# Patient Record
Sex: MALE | Race: WHITE | NOT HISPANIC OR LATINO | Employment: OTHER | URBAN - NONMETROPOLITAN AREA
[De-identification: names, ages, dates, MRNs, and addresses within clinical notes are randomized per-mention and may not be internally consistent; named-entity substitution may affect disease eponyms.]

---

## 2018-04-22 ENCOUNTER — HOSPITAL ENCOUNTER (EMERGENCY)
Facility: HOSPITAL | Age: 48
Discharge: HOME | End: 2018-04-22
Attending: EMERGENCY MEDICINE | Admitting: EMERGENCY MEDICINE

## 2018-04-22 ENCOUNTER — APPOINTMENT (OUTPATIENT)
Dept: GENERAL RADIOLOGY | Facility: HOSPITAL | Age: 48
End: 2018-04-22

## 2018-04-22 VITALS
DIASTOLIC BLOOD PRESSURE: 74 MMHG | SYSTOLIC BLOOD PRESSURE: 103 MMHG | TEMPERATURE: 98.3 F | BODY MASS INDEX: 21.98 KG/M2 | HEIGHT: 68 IN | RESPIRATION RATE: 17 BRPM | WEIGHT: 145 LBS | HEART RATE: 55 BPM | OXYGEN SATURATION: 98 %

## 2018-04-22 DIAGNOSIS — R07.81 PLEURITIC CHEST PAIN: Primary | ICD-10-CM

## 2018-04-22 LAB
ALBUMIN SERPL-MCNC: 4.6 G/DL (ref 3.5–5)
ALBUMIN/GLOB SERPL: 1.7 G/DL (ref 1–2)
ALP SERPL-CCNC: 65 U/L (ref 38–126)
ALT SERPL W P-5'-P-CCNC: 29 U/L (ref 13–69)
ANION GAP SERPL CALCULATED.3IONS-SCNC: 13.4 MMOL/L (ref 10–20)
AST SERPL-CCNC: 25 U/L (ref 15–46)
BASOPHILS # BLD AUTO: 0.02 10*3/MM3 (ref 0–0.2)
BASOPHILS NFR BLD AUTO: 0.2 % (ref 0–2.5)
BILIRUB SERPL-MCNC: 0.5 MG/DL (ref 0.2–1.3)
BUN BLD-MCNC: 10 MG/DL (ref 7–20)
BUN/CREAT SERPL: 12.5 (ref 6.3–21.9)
CALCIUM SPEC-SCNC: 9.2 MG/DL (ref 8.4–10.2)
CHLORIDE SERPL-SCNC: 103 MMOL/L (ref 98–107)
CO2 SERPL-SCNC: 31 MMOL/L (ref 26–30)
CREAT BLD-MCNC: 0.8 MG/DL (ref 0.6–1.3)
DEPRECATED RDW RBC AUTO: 48.2 FL (ref 37–54)
EOSINOPHIL # BLD AUTO: 0.07 10*3/MM3 (ref 0–0.7)
EOSINOPHIL NFR BLD AUTO: 0.7 % (ref 0–7)
ERYTHROCYTE [DISTWIDTH] IN BLOOD BY AUTOMATED COUNT: 14.1 % (ref 11.5–14.5)
GFR SERPL CREATININE-BSD FRML MDRD: 104 ML/MIN/1.73
GLOBULIN UR ELPH-MCNC: 2.7 GM/DL
GLUCOSE BLD-MCNC: 99 MG/DL (ref 74–98)
HCT VFR BLD AUTO: 38.2 % (ref 42–52)
HGB BLD-MCNC: 13.3 G/DL (ref 14–18)
HOLD SPECIMEN: NORMAL
HOLD SPECIMEN: NORMAL
IMM GRANULOCYTES # BLD: 0.03 10*3/MM3 (ref 0–0.06)
IMM GRANULOCYTES NFR BLD: 0.3 % (ref 0–0.6)
LYMPHOCYTES # BLD AUTO: 1.97 10*3/MM3 (ref 0.6–3.4)
LYMPHOCYTES NFR BLD AUTO: 19.2 % (ref 10–50)
MCH RBC QN AUTO: 32.1 PG (ref 27–31)
MCHC RBC AUTO-ENTMCNC: 34.8 G/DL (ref 30–37)
MCV RBC AUTO: 92.3 FL (ref 80–94)
MONOCYTES # BLD AUTO: 0.99 10*3/MM3 (ref 0–0.9)
MONOCYTES NFR BLD AUTO: 9.6 % (ref 0–12)
NEUTROPHILS # BLD AUTO: 7.18 10*3/MM3 (ref 2–6.9)
NEUTROPHILS NFR BLD AUTO: 70 % (ref 37–80)
NRBC BLD MANUAL-RTO: 0 /100 WBC (ref 0–0)
PLATELET # BLD AUTO: 272 10*3/MM3 (ref 130–400)
PMV BLD AUTO: 10.6 FL (ref 6–12)
POTASSIUM BLD-SCNC: 4.4 MMOL/L (ref 3.5–5.1)
PROT SERPL-MCNC: 7.3 G/DL (ref 6.3–8.2)
RBC # BLD AUTO: 4.14 10*6/MM3 (ref 4.7–6.1)
SODIUM BLD-SCNC: 143 MMOL/L (ref 137–145)
TROPONIN I SERPL-MCNC: <0.012 NG/ML (ref 0–0.03)
WBC NRBC COR # BLD: 10.26 10*3/MM3 (ref 4.8–10.8)
WHOLE BLOOD HOLD SPECIMEN: NORMAL
WHOLE BLOOD HOLD SPECIMEN: NORMAL

## 2018-04-22 PROCEDURE — 25010000002 ONDANSETRON PER 1 MG: Performed by: NURSE PRACTITIONER

## 2018-04-22 PROCEDURE — 96375 TX/PRO/DX INJ NEW DRUG ADDON: CPT

## 2018-04-22 PROCEDURE — 84484 ASSAY OF TROPONIN QUANT: CPT | Performed by: EMERGENCY MEDICINE

## 2018-04-22 PROCEDURE — 85025 COMPLETE CBC W/AUTO DIFF WBC: CPT | Performed by: EMERGENCY MEDICINE

## 2018-04-22 PROCEDURE — 25010000002 KETOROLAC TROMETHAMINE PER 15 MG: Performed by: NURSE PRACTITIONER

## 2018-04-22 PROCEDURE — 99284 EMERGENCY DEPT VISIT MOD MDM: CPT

## 2018-04-22 PROCEDURE — 71045 X-RAY EXAM CHEST 1 VIEW: CPT

## 2018-04-22 PROCEDURE — 93005 ELECTROCARDIOGRAM TRACING: CPT | Performed by: EMERGENCY MEDICINE

## 2018-04-22 PROCEDURE — 96374 THER/PROPH/DIAG INJ IV PUSH: CPT

## 2018-04-22 PROCEDURE — 80053 COMPREHEN METABOLIC PANEL: CPT | Performed by: EMERGENCY MEDICINE

## 2018-04-22 RX ORDER — KETOROLAC TROMETHAMINE 30 MG/ML
30 INJECTION, SOLUTION INTRAMUSCULAR; INTRAVENOUS ONCE
Status: COMPLETED | OUTPATIENT
Start: 2018-04-22 | End: 2018-04-22

## 2018-04-22 RX ORDER — METHOCARBAMOL 750 MG/1
750 TABLET, FILM COATED ORAL 3 TIMES DAILY PRN
Qty: 21 TABLET | Refills: 0 | Status: SHIPPED | OUTPATIENT
Start: 2018-04-22 | End: 2018-08-03

## 2018-04-22 RX ORDER — PREGABALIN 25 MG/1
25 CAPSULE ORAL NIGHTLY
COMMUNITY
End: 2020-08-10

## 2018-04-22 RX ORDER — SODIUM CHLORIDE 0.9 % (FLUSH) 0.9 %
10 SYRINGE (ML) INJECTION AS NEEDED
Status: DISCONTINUED | OUTPATIENT
Start: 2018-04-22 | End: 2018-04-22 | Stop reason: HOSPADM

## 2018-04-22 RX ORDER — TRAZODONE HYDROCHLORIDE 50 MG/1
50 TABLET ORAL NIGHTLY
COMMUNITY
End: 2018-08-03

## 2018-04-22 RX ORDER — ASPIRIN 325 MG
325 TABLET ORAL ONCE
Status: COMPLETED | OUTPATIENT
Start: 2018-04-22 | End: 2018-04-22

## 2018-04-22 RX ORDER — MELOXICAM 7.5 MG/1
7.5 TABLET ORAL DAILY
Qty: 14 TABLET | Refills: 0 | Status: SHIPPED | OUTPATIENT
Start: 2018-04-22 | End: 2018-08-03

## 2018-04-22 RX ORDER — ONDANSETRON 2 MG/ML
4 INJECTION INTRAMUSCULAR; INTRAVENOUS ONCE
Status: COMPLETED | OUTPATIENT
Start: 2018-04-22 | End: 2018-04-22

## 2018-04-22 RX ADMIN — ASPIRIN 325 MG ORAL TABLET 325 MG: 325 PILL ORAL at 19:34

## 2018-04-22 RX ADMIN — ONDANSETRON 4 MG: 2 INJECTION INTRAMUSCULAR; INTRAVENOUS at 19:34

## 2018-04-22 RX ADMIN — KETOROLAC TROMETHAMINE 30 MG: 30 INJECTION, SOLUTION INTRAMUSCULAR; INTRAVENOUS at 19:34

## 2018-04-22 NOTE — ED PROVIDER NOTES
Subjective   History of Present Illness  This is a 47 year old male who comes in today complaining of left side chest pain. He reports the pain started with tightening in his chest about 1 week ago. He reports that 2 days ago the pain started getting worse with deep breathing and with any movement. He also reports some nausea with these symptoms.   Review of Systems   Constitutional: Negative.    HENT: Negative.    Eyes: Negative.    Respiratory: Positive for shortness of breath.    Cardiovascular: Positive for chest pain.   Gastrointestinal: Positive for nausea.   Endocrine: Negative.    Genitourinary: Negative.    Musculoskeletal: Negative.    Skin: Negative.    Allergic/Immunologic: Negative.    Neurological: Negative.    Hematological: Negative.    Psychiatric/Behavioral: Negative.        Past Medical History:   Diagnosis Date   • Injury of back        Allergies   Allergen Reactions   • Penicillins Anaphylaxis       History reviewed. No pertinent surgical history.    History reviewed. No pertinent family history.    Social History     Social History   • Marital status:      Social History Main Topics   • Smoking status: Current Every Day Smoker     Packs/day: 0.50     Types: Cigarettes   • Alcohol use Yes   • Drug use: No     Other Topics Concern   • Not on file           Objective   Physical Exam   Constitutional: He appears well-developed and well-nourished.   Nursing note and vitals reviewed.  GEN: No acute distress  Head: Normocephalic, atraumatic  Eyes: Pupils equal round reactive to light  ENT: Posterior pharynx normal in appearance, oral mucosa is moist  Chest: Nontender to palpation  Cardiovascular: Regular rate  Lungs: Clear to auscultation bilaterally  Abdomen: Soft, nontender, nondistended, no peritoneal signs  Extremities: No edema, normal appearance  Neuro: GCS 15  Psych: Mood and affect are appropriate      ECG 12 Lead    Date/Time: 4/22/2018 7:19 PM  Performed by: PHYLLIS CARRASCO  PAZ  Authorized by: JOSEPH ESCALANTE   Interpreted by physician  Comparison: not compared with previous ECG   Previous ECG: no previous ECG available  Rhythm: sinus rhythm  Clinical impression: non-specific ECG               ED Course  ED Course                  MDM  Number of Diagnoses or Management Options     Amount and/or Complexity of Data Reviewed  Clinical lab tests: ordered and reviewed  Tests in the radiology section of CPT®: ordered and reviewed  Review and summarize past medical records: yes  Discuss the patient with other providers: yes  Independent visualization of images, tracings, or specimens: yes    Risk of Complications, Morbidity, and/or Mortality  Presenting problems: moderate  Diagnostic procedures: moderate  Management options: moderate        Final diagnoses:   Pleuritic chest pain            Amaris Beard, RICARDO  04/22/18 2020

## 2018-08-01 DIAGNOSIS — M79.641 RIGHT HAND PAIN: Primary | ICD-10-CM

## 2018-08-03 ENCOUNTER — PREP FOR SURGERY (OUTPATIENT)
Dept: OTHER | Facility: HOSPITAL | Age: 48
End: 2018-08-03

## 2018-08-03 ENCOUNTER — OFFICE VISIT (OUTPATIENT)
Dept: ORTHOPEDIC SURGERY | Facility: CLINIC | Age: 48
End: 2018-08-03

## 2018-08-03 VITALS — WEIGHT: 137 LBS | BODY MASS INDEX: 22.82 KG/M2 | RESPIRATION RATE: 18 BRPM | HEIGHT: 65 IN

## 2018-08-03 DIAGNOSIS — M65.331 TRIGGER MIDDLE FINGER OF RIGHT HAND: Primary | ICD-10-CM

## 2018-08-03 PROCEDURE — 99213 OFFICE O/P EST LOW 20 MIN: CPT | Performed by: PHYSICIAN ASSISTANT

## 2018-08-03 RX ORDER — CLINDAMYCIN PHOSPHATE 900 MG/50ML
900 INJECTION, SOLUTION INTRAVENOUS
Status: CANCELLED | OUTPATIENT
Start: 2018-08-09 | End: 2018-08-10

## 2018-08-03 NOTE — PROGRESS NOTES
Subjective   Patient ID: Kenan Miranda is a 47 y.o. right hand dominant male  Pain of the Right Hand (Trigger Finger - Index)         History of Present Illness  Patient presents with chronic continued right third digit trigger finger.  He states he was told in the past he would need surgery for a trigger finger release however, he had to postpone this due to a death in his family.  He states the pain has continued.  He denies injury or trauma.  He states occasionally he does have some numbness and tingling into the second and third digits.  He was also told in the past he has rheumatoid arthritis but has never been evaluated by rheumatologist.        Pain Score: 6  Pain Location: Hand  Pain Orientation: Right     Pain Descriptors: Aching  Pain Frequency: Constant/continuous                    Pain Intervention(s): Rest          Past Medical History:   Diagnosis Date   • Injury of back    • Rheumatoid arthritis (CMS/HCC)         Past Surgical History:   Procedure Laterality Date   • COLONOSCOPY         Family History   Problem Relation Age of Onset   • Cancer Other        Social History     Social History   • Marital status:      Spouse name: N/A   • Number of children: N/A   • Years of education: N/A     Occupational History   • Not on file.     Social History Main Topics   • Smoking status: Current Every Day Smoker     Packs/day: 0.50     Types: Cigarettes   • Smokeless tobacco: Not on file   • Alcohol use Yes   • Drug use: Yes   • Sexual activity: Defer     Other Topics Concern   • Not on file     Social History Narrative   • No narrative on file         Current Outpatient Prescriptions:   •  pregabalin (LYRICA) 25 MG capsule, Take 25 mg by mouth Every Night., Disp: , Rfl:   •  SUBOXONE 8-2 MG film film, , Disp: , Rfl:     Allergies   Allergen Reactions   • Penicillins Anaphylaxis       Review of Systems   Constitutional: Negative for fever.   HENT: Negative for voice change.    Eyes: Negative for visual  "disturbance.   Respiratory: Negative for shortness of breath.    Cardiovascular: Negative for chest pain.   Gastrointestinal: Negative for abdominal distention and abdominal pain.   Genitourinary: Negative for dysuria.   Musculoskeletal: Positive for arthralgias. Negative for gait problem and joint swelling.   Skin: Negative for rash.   Neurological: Negative for speech difficulty.   Hematological: Does not bruise/bleed easily.   Psychiatric/Behavioral: Negative for confusion.   All other systems reviewed and are negative.      Objective   Resp 18   Ht 165.1 cm (65\")   Wt 62.1 kg (137 lb)   BMI 22.80 kg/m²    Physical Exam   Constitutional: He is oriented to person, place, and time. He appears well-nourished.   Eyes: Conjunctivae are normal.   Neck: No tracheal deviation present.   Cardiovascular: Normal rate.    Pulmonary/Chest: Effort normal. No respiratory distress.   Abdominal: He exhibits no distension.   Musculoskeletal:        Right wrist: He exhibits normal range of motion, no tenderness, no bony tenderness, no swelling, no effusion, no crepitus, no deformity and no laceration.        Right hand: He exhibits tenderness. He exhibits normal capillary refill, no deformity and no swelling. Normal sensation noted. Normal strength noted.   Neurological: He is alert and oriented to person, place, and time.   Skin: Capillary refill takes less than 2 seconds.   Psychiatric: He has a normal mood and affect.   Vitals reviewed.    Right Hand Exam     Range of Motion     Wrist   Extension: normal   Flexion: normal   Pronation: normal   Supination: normal     Muscle Strength   The patient has normal right wrist strength.    Tests   Tinel’s Sign (Medial Nerve): positive  Finkelstein: negative    Other   Erythema: absent  Sensation: normal  Pulse: present           Mothers no deformity to bilateral hands there are nodules consistent with rheumatoid nodules to the DIP joints of the right and left hand  Neurologic Exam "     Mental Status   Oriented to person, place, and time.      Right Hand/Wrist Exam     Muscle Strength   Normal right wrist strength           There is a locking mechanism with flexion of the right third digit consistent with trigger finger.  There is a painful palpable nodule overlying the A1 pulley.  No redness or warmth to the hand    Assessment/Plan   Independent Review of Radiographic Studies:    No new imaging done today.      Procedures       Kenan was seen today for pain.    Diagnoses and all orders for this visit:    Trigger middle finger of right hand       Orthopedic activities reviewed and patient expressed appreciation  Discussion of orthopedic goals  Risk, benefits, and merits of treatment alternatives reviewed with the patient and questions answered  The nature of the proposed surgery reviewed with the patient including risks, benefits, rehabilitation, recovery timeframe, and outcome expectations    Recommendations/Plan:  Patient is encouraged to call or return for any issues or concerns.  PLAN: Right 3rd digit Trigger finger release    I discussed with the patient because he does have underlying past medical history for rheumatoid arthritis and clinically his exam is consistent with carpal tunnel syndrome starting with trigger finger release would be a good option but he may need to be evaluated by rheumatologist for his rheumatoid arthritis.  Patient agreeable to call or return sooner for any concerns.

## 2018-08-06 ENCOUNTER — APPOINTMENT (OUTPATIENT)
Dept: PREADMISSION TESTING | Facility: HOSPITAL | Age: 48
End: 2018-08-06

## 2018-08-07 ENCOUNTER — APPOINTMENT (OUTPATIENT)
Dept: PREADMISSION TESTING | Facility: HOSPITAL | Age: 48
End: 2018-08-07

## 2018-09-28 ENCOUNTER — TELEPHONE (OUTPATIENT)
Dept: ORTHOPEDIC SURGERY | Facility: CLINIC | Age: 48
End: 2018-09-28

## 2018-12-16 ENCOUNTER — HOSPITAL ENCOUNTER (EMERGENCY)
Facility: HOSPITAL | Age: 48
Discharge: HOME | End: 2018-12-16
Attending: STUDENT IN AN ORGANIZED HEALTH CARE EDUCATION/TRAINING PROGRAM | Admitting: STUDENT IN AN ORGANIZED HEALTH CARE EDUCATION/TRAINING PROGRAM

## 2018-12-16 VITALS
BODY MASS INDEX: 21.66 KG/M2 | HEIGHT: 67 IN | HEART RATE: 81 BPM | DIASTOLIC BLOOD PRESSURE: 72 MMHG | OXYGEN SATURATION: 99 % | SYSTOLIC BLOOD PRESSURE: 129 MMHG | TEMPERATURE: 98.3 F | WEIGHT: 138 LBS | RESPIRATION RATE: 17 BRPM

## 2018-12-16 DIAGNOSIS — J36 PERITONSILLAR ABSCESS: Primary | ICD-10-CM

## 2018-12-16 PROCEDURE — 25010000002 DEXAMETHASONE SODIUM PHOSPHATE 10 MG/ML SOLUTION: Performed by: NURSE PRACTITIONER

## 2018-12-16 PROCEDURE — 99282 EMERGENCY DEPT VISIT SF MDM: CPT

## 2018-12-16 PROCEDURE — 96372 THER/PROPH/DIAG INJ SC/IM: CPT

## 2018-12-16 RX ORDER — DEXAMETHASONE SODIUM PHOSPHATE 10 MG/ML
10 INJECTION, SOLUTION INTRAMUSCULAR; INTRAVENOUS ONCE
Status: COMPLETED | OUTPATIENT
Start: 2018-12-16 | End: 2018-12-16

## 2018-12-16 RX ORDER — CLINDAMYCIN HYDROCHLORIDE 300 MG/1
300 CAPSULE ORAL 4 TIMES DAILY
Qty: 28 CAPSULE | Refills: 0 | Status: SHIPPED | OUTPATIENT
Start: 2018-12-16 | End: 2020-08-10

## 2018-12-16 RX ADMIN — DEXAMETHASONE SODIUM PHOSPHATE 10 MG: 10 INJECTION, SOLUTION INTRAMUSCULAR; INTRAVENOUS at 15:17

## 2018-12-16 NOTE — ED PROVIDER NOTES
Subjective   History of Present Illness  This 48-year-old gentleman who comes in today complaining of a sore throat ×3 days.  He reports that the pain is more intense and more difficult to swallow today.  He denies any nausea vomiting or diarrhea.  Review of Systems   Constitutional: Negative.    HENT: Positive for sore throat.    Eyes: Negative.    Respiratory: Negative.    Cardiovascular: Negative.    Gastrointestinal: Negative.    Endocrine: Negative.    Genitourinary: Negative.    Musculoskeletal: Negative.    Skin: Negative.    Allergic/Immunologic: Negative.    Neurological: Negative.    Hematological: Negative.    Psychiatric/Behavioral: Negative.    All other systems reviewed and are negative.      Past Medical History:   Diagnosis Date   • Injury of back    • Rheumatoid arthritis (CMS/HCC)        Allergies   Allergen Reactions   • Penicillins Anaphylaxis       Past Surgical History:   Procedure Laterality Date   • COLONOSCOPY         Family History   Problem Relation Age of Onset   • Cancer Other        Social History     Socioeconomic History   • Marital status:      Spouse name: Not on file   • Number of children: Not on file   • Years of education: Not on file   • Highest education level: Not on file   Tobacco Use   • Smoking status: Current Every Day Smoker     Packs/day: 0.50     Types: Cigarettes   • Smokeless tobacco: Never Used   Substance and Sexual Activity   • Alcohol use: Yes   • Drug use: No     Comment: Patient denies history of illegal substance abuse   • Sexual activity: Defer           Objective   Physical Exam   Constitutional: He appears well-developed and well-nourished.   HENT:   Mouth/Throat:       Nursing note and vitals reviewed.  GEN: No acute distress  Head: Normocephalic, atraumatic  Eyes: Pupils equal round reactive to light  ENT: Posterior pharynx normal in appearance, oral mucosa is moist  Chest: Nontender to palpation  Cardiovascular: Regular rate  Lungs: Clear to  auscultation bilaterally  Abdomen: Soft, nontender, nondistended, no peritoneal signs  Extremities: No edema, normal appearance  Neuro: GCS 15  Psych: Mood and affect are appropriate      Procedures           ED Course                  MDM  Number of Diagnoses or Management Options  Diagnosis management comments: Dr. Peterson also in to evaluate patient.  At this time drainage of the abscess does not appear to be necessary we will go ahead and treat him with steroid here and give him some clindamycin to start it fact he has an anaphylaxis to penicillin.  We will also have him follow-up with ENT tomorrow for reevaluation.  I've given him strict return to care structures including any compromise of his airway and he is agreeable to this plan of care.       Amount and/or Complexity of Data Reviewed  Review and summarize past medical records: yes  Discuss the patient with other providers: yes    Risk of Complications, Morbidity, and/or Mortality  Presenting problems: moderate  Diagnostic procedures: low  Management options: moderate          Final diagnoses:   Peritonsillar abscess            Amaris Beard, APRN  12/16/18 1708

## 2019-10-09 ENCOUNTER — APPOINTMENT (OUTPATIENT)
Dept: ULTRASOUND IMAGING | Facility: HOSPITAL | Age: 49
End: 2019-10-09

## 2019-10-09 ENCOUNTER — HOSPITAL ENCOUNTER (EMERGENCY)
Facility: HOSPITAL | Age: 49
Discharge: HOME | End: 2019-10-09
Attending: EMERGENCY MEDICINE | Admitting: EMERGENCY MEDICINE

## 2019-10-09 ENCOUNTER — APPOINTMENT (OUTPATIENT)
Dept: GENERAL RADIOLOGY | Facility: HOSPITAL | Age: 49
End: 2019-10-09

## 2019-10-09 VITALS
WEIGHT: 150 LBS | OXYGEN SATURATION: 98 % | DIASTOLIC BLOOD PRESSURE: 78 MMHG | SYSTOLIC BLOOD PRESSURE: 105 MMHG | RESPIRATION RATE: 18 BRPM | HEIGHT: 67 IN | HEART RATE: 78 BPM | TEMPERATURE: 98.4 F | BODY MASS INDEX: 23.54 KG/M2

## 2019-10-09 DIAGNOSIS — M71.21 BAKER'S CYST OF KNEE, RIGHT: Primary | ICD-10-CM

## 2019-10-09 PROCEDURE — 99283 EMERGENCY DEPT VISIT LOW MDM: CPT

## 2019-10-09 PROCEDURE — 73562 X-RAY EXAM OF KNEE 3: CPT

## 2019-10-09 PROCEDURE — 93971 EXTREMITY STUDY: CPT

## 2019-10-09 RX ORDER — SENNOSIDES 8.6 MG
650 CAPSULE ORAL EVERY 8 HOURS PRN
Qty: 12 TABLET | Refills: 0 | Status: SHIPPED | OUTPATIENT
Start: 2019-10-09 | End: 2023-03-22 | Stop reason: HOSPADM

## 2019-10-09 RX ORDER — IBUPROFEN 600 MG/1
600 TABLET ORAL ONCE
Status: COMPLETED | OUTPATIENT
Start: 2019-10-09 | End: 2019-10-09

## 2019-10-09 RX ORDER — IBUPROFEN 600 MG/1
600 TABLET ORAL EVERY 8 HOURS PRN
Qty: 12 TABLET | Refills: 0 | Status: SHIPPED | OUTPATIENT
Start: 2019-10-09 | End: 2022-12-10 | Stop reason: SDUPTHER

## 2019-10-09 RX ORDER — ACETAMINOPHEN 325 MG/1
650 TABLET ORAL ONCE
Status: COMPLETED | OUTPATIENT
Start: 2019-10-09 | End: 2019-10-09

## 2019-10-09 RX ADMIN — ACETAMINOPHEN 650 MG: 325 TABLET, FILM COATED ORAL at 19:52

## 2019-10-09 RX ADMIN — IBUPROFEN 600 MG: 600 TABLET ORAL at 19:52

## 2019-10-09 NOTE — ED PROVIDER NOTES
Subjective   Patient is here with complaint of soreness to the right knee for the past couple of days has noted a questionable knot area popliteal space for this time.  No falls or injuries no fevers chills no other systemic complaints presents here for further evaluation        History provided by:  Patient      Review of Systems   Constitutional: Negative.    HENT: Negative.    Respiratory: Negative.    Cardiovascular: Negative.  Negative for leg swelling.   Musculoskeletal: Positive for arthralgias.   Skin: Negative.    Neurological: Negative.    Psychiatric/Behavioral: Negative.    All other systems reviewed and are negative.      Past Medical History:   Diagnosis Date   • Injury of back    • Rheumatoid arthritis (CMS/HCC)        Allergies   Allergen Reactions   • Penicillins Anaphylaxis       Past Surgical History:   Procedure Laterality Date   • COLONOSCOPY         Family History   Problem Relation Age of Onset   • Cancer Other        Social History     Socioeconomic History   • Marital status:      Spouse name: Not on file   • Number of children: Not on file   • Years of education: Not on file   • Highest education level: Not on file   Tobacco Use   • Smoking status: Current Every Day Smoker     Packs/day: 0.50     Types: Cigarettes   • Smokeless tobacco: Never Used   Substance and Sexual Activity   • Alcohol use: Yes   • Drug use: No     Comment: Patient denies history of illegal substance abuse   • Sexual activity: Defer           Objective   Physical Exam   Constitutional: He is oriented to person, place, and time. He appears well-developed and well-nourished.   aFebrile nontoxic no acute distress   HENT:   Head: Normocephalic and atraumatic.   Eyes: EOM are normal. Pupils are equal, round, and reactive to light.   Neck: Normal range of motion.   Cardiovascular: Normal rate, regular rhythm and intact distal pulses.   Pulmonary/Chest: Effort normal.   Musculoskeletal: He exhibits tenderness. He  exhibits no edema or deformity.   Mild Tenderness right popliteal no edema no erythema full range of motion mild tenderness anterior aspect right knee as well   Neurological: He is alert and oriented to person, place, and time. No cranial nerve deficit or sensory deficit. He exhibits normal muscle tone. Coordination normal.   Skin: Skin is warm and dry. Capillary refill takes less than 2 seconds. No rash noted. No erythema. No pallor.   Psychiatric: He has a normal mood and affect. His behavior is normal. Judgment and thought content normal.   Nursing note and vitals reviewed.      Procedures           ED Course  ED Course as of Oct 09 1953   Wed Oct 09, 2019   1940 Patient resting comfortably no distress pending ultrasound report  [SC]   1947 Ultrasound demonstrates Baker's cyst will recommend follow-up with orthopedics patient agreeable with plan  [SC]      ED Course User Index  [SC] Moiz Goldberg PA-C                  MDM  Number of Diagnoses or Management Options     Amount and/or Complexity of Data Reviewed  Review and summarize past medical records: yes  Discuss the patient with other providers: yes    Risk of Complications, Morbidity, and/or Mortality  Presenting problems: low  Diagnostic procedures: low  Management options: low        Final diagnoses:   Baker's cyst of knee, right              Moiz Goldberg PA-C  10/09/19 1953

## 2019-11-11 ENCOUNTER — TRANSCRIBE ORDERS (OUTPATIENT)
Dept: ADMINISTRATIVE | Facility: HOSPITAL | Age: 49
End: 2019-11-11

## 2019-11-11 DIAGNOSIS — M25.561 RIGHT KNEE PAIN, UNSPECIFIED CHRONICITY: Primary | ICD-10-CM

## 2019-11-15 ENCOUNTER — HOSPITAL ENCOUNTER (OUTPATIENT)
Dept: MRI IMAGING | Facility: HOSPITAL | Age: 49
Discharge: HOME | End: 2019-11-15
Admitting: ORTHOPAEDIC SURGERY

## 2019-11-15 DIAGNOSIS — M25.561 RIGHT KNEE PAIN, UNSPECIFIED CHRONICITY: ICD-10-CM

## 2019-11-15 PROCEDURE — 73721 MRI JNT OF LWR EXTRE W/O DYE: CPT

## 2020-07-06 ENCOUNTER — TELEPHONE (OUTPATIENT)
Dept: SURGERY | Facility: CLINIC | Age: 50
End: 2020-07-06

## 2020-07-08 ENCOUNTER — PREP FOR SURGERY (OUTPATIENT)
Dept: OTHER | Facility: HOSPITAL | Age: 50
End: 2020-07-08

## 2020-07-08 DIAGNOSIS — Z01.818 PREOP TESTING: Primary | ICD-10-CM

## 2020-07-08 DIAGNOSIS — Z80.0 FAMILY HISTORY OF COLON CANCER REQUIRING SCREENING COLONOSCOPY: Primary | ICD-10-CM

## 2020-07-08 RX ORDER — POLYETHYLENE GLYCOL 3350 17 G/17G
POWDER, FOR SOLUTION ORAL
Qty: 238 G | Refills: 0 | Status: SHIPPED | OUTPATIENT
Start: 2020-07-08 | End: 2020-08-12 | Stop reason: HOSPADM

## 2020-07-08 RX ORDER — BISACODYL 5 MG/1
TABLET, DELAYED RELEASE ORAL
Qty: 4 TABLET | Refills: 0 | Status: SHIPPED | OUTPATIENT
Start: 2020-07-08 | End: 2020-08-12 | Stop reason: HOSPADM

## 2020-07-13 PROBLEM — Z80.0 FAMILY HISTORY OF COLON CANCER REQUIRING SCREENING COLONOSCOPY: Status: ACTIVE | Noted: 2020-07-13

## 2020-08-10 ENCOUNTER — LAB (OUTPATIENT)
Dept: LAB | Facility: HOSPITAL | Age: 50
End: 2020-08-10

## 2020-08-10 DIAGNOSIS — Z01.818 PREOP TESTING: ICD-10-CM

## 2020-08-10 PROCEDURE — C9803 HOPD COVID-19 SPEC COLLECT: HCPCS

## 2020-08-10 PROCEDURE — U0002 COVID-19 LAB TEST NON-CDC: HCPCS

## 2020-08-10 PROCEDURE — U0004 COV-19 TEST NON-CDC HGH THRU: HCPCS

## 2020-08-11 LAB
REF LAB TEST METHOD: NORMAL
SARS-COV-2 RNA RESP QL NAA+PROBE: NOT DETECTED

## 2020-08-12 ENCOUNTER — HOSPITAL ENCOUNTER (OUTPATIENT)
Facility: HOSPITAL | Age: 50
Setting detail: HOSPITAL OUTPATIENT SURGERY
Discharge: HOME | End: 2020-08-12
Attending: SURGERY | Admitting: SURGERY

## 2020-08-12 ENCOUNTER — ANESTHESIA EVENT (OUTPATIENT)
Dept: GASTROENTEROLOGY | Facility: HOSPITAL | Age: 50
End: 2020-08-12

## 2020-08-12 ENCOUNTER — ANESTHESIA (OUTPATIENT)
Dept: GASTROENTEROLOGY | Facility: HOSPITAL | Age: 50
End: 2020-08-12

## 2020-08-12 VITALS
WEIGHT: 148 LBS | RESPIRATION RATE: 17 BRPM | OXYGEN SATURATION: 96 % | BODY MASS INDEX: 22.43 KG/M2 | DIASTOLIC BLOOD PRESSURE: 69 MMHG | HEIGHT: 68 IN | TEMPERATURE: 97.2 F | HEART RATE: 58 BPM | SYSTOLIC BLOOD PRESSURE: 101 MMHG

## 2020-08-12 PROCEDURE — S0260 H&P FOR SURGERY: HCPCS | Performed by: SURGERY

## 2020-08-12 PROCEDURE — 25010000002 PROPOFOL 10 MG/ML EMULSION: Performed by: NURSE ANESTHETIST, CERTIFIED REGISTERED

## 2020-08-12 PROCEDURE — 45378 DIAGNOSTIC COLONOSCOPY: CPT | Performed by: SURGERY

## 2020-08-12 RX ORDER — SODIUM CHLORIDE, SODIUM LACTATE, POTASSIUM CHLORIDE, CALCIUM CHLORIDE 600; 310; 30; 20 MG/100ML; MG/100ML; MG/100ML; MG/100ML
1000 INJECTION, SOLUTION INTRAVENOUS CONTINUOUS
Status: DISCONTINUED | OUTPATIENT
Start: 2020-08-12 | End: 2020-08-12 | Stop reason: HOSPADM

## 2020-08-12 RX ORDER — ONDANSETRON 2 MG/ML
4 INJECTION INTRAMUSCULAR; INTRAVENOUS ONCE AS NEEDED
Status: DISCONTINUED | OUTPATIENT
Start: 2020-08-12 | End: 2020-08-12 | Stop reason: HOSPADM

## 2020-08-12 RX ORDER — LIDOCAINE HYDROCHLORIDE 20 MG/ML
INJECTION, SOLUTION INTRAVENOUS AS NEEDED
Status: DISCONTINUED | OUTPATIENT
Start: 2020-08-12 | End: 2020-08-12 | Stop reason: SURG

## 2020-08-12 RX ORDER — SODIUM CHLORIDE 0.9 % (FLUSH) 0.9 %
10 SYRINGE (ML) INJECTION AS NEEDED
Status: DISCONTINUED | OUTPATIENT
Start: 2020-08-12 | End: 2020-08-12 | Stop reason: HOSPADM

## 2020-08-12 RX ORDER — MAGNESIUM HYDROXIDE 1200 MG/15ML
LIQUID ORAL AS NEEDED
Status: DISCONTINUED | OUTPATIENT
Start: 2020-08-12 | End: 2020-08-12 | Stop reason: HOSPADM

## 2020-08-12 RX ORDER — PROPOFOL 10 MG/ML
VIAL (ML) INTRAVENOUS AS NEEDED
Status: DISCONTINUED | OUTPATIENT
Start: 2020-08-12 | End: 2020-08-12 | Stop reason: SURG

## 2020-08-12 RX ADMIN — PROPOFOL 100 MG: 10 INJECTION, EMULSION INTRAVENOUS at 08:51

## 2020-08-12 RX ADMIN — PROPOFOL 100 MG: 10 INJECTION, EMULSION INTRAVENOUS at 08:36

## 2020-08-12 RX ADMIN — LIDOCAINE HYDROCHLORIDE 60 MG: 20 INJECTION, SOLUTION INTRAVENOUS at 08:26

## 2020-08-12 RX ADMIN — PROPOFOL 140 MCG/KG/MIN: 10 INJECTION, EMULSION INTRAVENOUS at 08:26

## 2020-08-12 RX ADMIN — PROPOFOL 100 MG: 10 INJECTION, EMULSION INTRAVENOUS at 08:26

## 2020-08-12 RX ADMIN — PROPOFOL 100 MG: 10 INJECTION, EMULSION INTRAVENOUS at 09:00

## 2020-08-12 RX ADMIN — PROPOFOL 50 MG: 10 INJECTION, EMULSION INTRAVENOUS at 08:46

## 2020-08-12 RX ADMIN — SODIUM CHLORIDE, POTASSIUM CHLORIDE, SODIUM LACTATE AND CALCIUM CHLORIDE 1000 ML: 600; 310; 30; 20 INJECTION, SOLUTION INTRAVENOUS at 06:50

## 2020-08-12 NOTE — DISCHARGE INSTRUCTIONS
Please follow all post op instructions and follow up appointment time from your physician's office included in your discharge packet.  .   No pushing, pulling, tugging,  heavy lifting, or strenuous activity.  No major decision making, driving, or drinking alcoholic beverages for 24 hours. ( due to the medications you have  received)  Always use good hand hygiene/washing techniques.  NO driving while taking pain medications.      To assist you in voiding:  Drink plenty of fluids  Listen to running water while attempting to void.    If you are unable to urinate and you have an uncomfortable urge to void or it has been   6 hours since you were discharged, return to the Emergency Room

## 2020-08-12 NOTE — ANESTHESIA PREPROCEDURE EVALUATION
Anesthesia Evaluation     Patient summary reviewed and Nursing notes reviewed   no history of anesthetic complications:  NPO Solid Status: > 8 hours  NPO Liquid Status: > 8 hours           Airway   Mallampati: I  TM distance: >3 FB  Neck ROM: full  no difficulty expected  Dental - normal exam   (+) partials    Pulmonary - normal exam   (+) a smoker Current Smoked day of surgery, asthma,  Cardiovascular - normal exam    Rhythm: regular  Rate: normal        Neuro/Psych  GI/Hepatic/Renal/Endo    (+)  GERD,      Musculoskeletal     (+) back pain, chronic pain,   Abdominal  - normal exam    Abdomen: soft.  Bowel sounds: normal.   Substance History   (+) alcohol use,      OB/GYN          Other   arthritis,                      Anesthesia Plan    ASA 2     MAC   (Risks and benefits discussed including risk of aspiration, recall and dental damage. All patient questions answered. Will continue with POC.)  intravenous induction     Anesthetic plan, all risks, benefits, and alternatives have been provided, discussed and informed consent has been obtained with: patient.

## 2020-08-12 NOTE — ANESTHESIA POSTPROCEDURE EVALUATION
Patient: Kenan Miranda    Procedure Summary     Date:  08/12/20 Room / Location:  [facility] ENDOSCOPY 2 / [facility] ENDOSCOPY    Anesthesia Start:  0822 Anesthesia Stop:      Procedure:  COLONOSCOPY (N/A Anus) Diagnosis:       Family history of colon cancer requiring screening colonoscopy      (Family history of colon cancer requiring screening colonoscopy [Z80.0])    Surgeon:  Michael Vivar MD Provider:  Lucas Hallman CRNA    Anesthesia Type:  MAC ASA Status:  2          Anesthesia Type: MAC    Vitals  Temp 98  HR 78  Sat 98  Resp 16  BP 92/56        Post Anesthesia Care and Evaluation    Patient location during evaluation: bedside  Patient participation: complete - patient participated  Level of consciousness: sleepy but conscious  Pain score: 0  Pain management: adequate  Airway patency: patent  Anesthetic complications: No anesthetic complications  PONV Status: none  Cardiovascular status: acceptable  Respiratory status: acceptable  Hydration status: acceptable

## 2021-01-18 ENCOUNTER — HOSPITAL ENCOUNTER (EMERGENCY)
Facility: HOSPITAL | Age: 51
Discharge: HOME | End: 2021-01-18
Attending: STUDENT IN AN ORGANIZED HEALTH CARE EDUCATION/TRAINING PROGRAM | Admitting: STUDENT IN AN ORGANIZED HEALTH CARE EDUCATION/TRAINING PROGRAM

## 2021-01-18 VITALS
BODY MASS INDEX: 23.49 KG/M2 | WEIGHT: 155 LBS | TEMPERATURE: 99 F | RESPIRATION RATE: 18 BRPM | HEART RATE: 79 BPM | HEIGHT: 68 IN | DIASTOLIC BLOOD PRESSURE: 77 MMHG | SYSTOLIC BLOOD PRESSURE: 129 MMHG | OXYGEN SATURATION: 96 %

## 2021-01-18 DIAGNOSIS — F19.10 SUBSTANCE ABUSE (HCC): Primary | ICD-10-CM

## 2021-01-18 LAB
6-ACETYL MORPHINE: POSITIVE
ALBUMIN SERPL-MCNC: 4.59 G/DL (ref 3.5–5.2)
ALBUMIN/GLOB SERPL: 1.7 G/DL
ALP SERPL-CCNC: 69 U/L (ref 39–117)
ALT SERPL W P-5'-P-CCNC: 24 U/L (ref 1–41)
AMPHET+METHAMPHET UR QL: NEGATIVE
ANION GAP SERPL CALCULATED.3IONS-SCNC: 12.3 MMOL/L (ref 5–15)
AST SERPL-CCNC: 22 U/L (ref 1–40)
BACTERIA UR QL AUTO: ABNORMAL /HPF
BARBITURATES UR QL SCN: NEGATIVE
BASOPHILS # BLD AUTO: 0.03 10*3/MM3 (ref 0–0.2)
BASOPHILS NFR BLD AUTO: 0.3 % (ref 0–1.5)
BENZODIAZ UR QL SCN: NEGATIVE
BILIRUB SERPL-MCNC: 0.3 MG/DL (ref 0–1.2)
BILIRUB UR QL STRIP: ABNORMAL
BUN SERPL-MCNC: 12 MG/DL (ref 6–20)
BUN/CREAT SERPL: 14 (ref 7–25)
BUPRENORPHINE SERPL-MCNC: POSITIVE NG/ML
CALCIUM SPEC-SCNC: 9.7 MG/DL (ref 8.6–10.5)
CANNABINOIDS SERPL QL: POSITIVE
CHLORIDE SERPL-SCNC: 100 MMOL/L (ref 98–107)
CLARITY UR: CLEAR
CO2 SERPL-SCNC: 25.7 MMOL/L (ref 22–29)
COCAINE UR QL: NEGATIVE
COLOR UR: ABNORMAL
CREAT SERPL-MCNC: 0.86 MG/DL (ref 0.76–1.27)
DEPRECATED RDW RBC AUTO: 47.9 FL (ref 37–54)
EOSINOPHIL # BLD AUTO: 0.11 10*3/MM3 (ref 0–0.4)
EOSINOPHIL NFR BLD AUTO: 1.1 % (ref 0.3–6.2)
ERYTHROCYTE [DISTWIDTH] IN BLOOD BY AUTOMATED COUNT: 13.9 % (ref 12.3–15.4)
ETHANOL BLD-MCNC: <10 MG/DL (ref 0–10)
ETHANOL UR QL: <0.01 %
FLUAV RNA RESP QL NAA+PROBE: NOT DETECTED
FLUBV RNA RESP QL NAA+PROBE: NOT DETECTED
GFR SERPL CREATININE-BSD FRML MDRD: 95 ML/MIN/1.73
GLOBULIN UR ELPH-MCNC: 2.7 GM/DL
GLUCOSE SERPL-MCNC: 152 MG/DL (ref 65–99)
GLUCOSE UR STRIP-MCNC: NEGATIVE MG/DL
HCT VFR BLD AUTO: 40.9 % (ref 37.5–51)
HGB BLD-MCNC: 13.6 G/DL (ref 13–17.7)
HGB UR QL STRIP.AUTO: ABNORMAL
HYALINE CASTS UR QL AUTO: ABNORMAL /LPF
IMM GRANULOCYTES # BLD AUTO: 0.03 10*3/MM3 (ref 0–0.05)
IMM GRANULOCYTES NFR BLD AUTO: 0.3 % (ref 0–0.5)
KETONES UR QL STRIP: ABNORMAL
LEUKOCYTE ESTERASE UR QL STRIP.AUTO: NEGATIVE
LYMPHOCYTES # BLD AUTO: 2.76 10*3/MM3 (ref 0.7–3.1)
LYMPHOCYTES NFR BLD AUTO: 27.2 % (ref 19.6–45.3)
MAGNESIUM SERPL-MCNC: 2 MG/DL (ref 1.6–2.6)
MCH RBC QN AUTO: 30.9 PG (ref 26.6–33)
MCHC RBC AUTO-ENTMCNC: 33.3 G/DL (ref 31.5–35.7)
MCV RBC AUTO: 93 FL (ref 79–97)
METHADONE UR QL SCN: NEGATIVE
MONOCYTES # BLD AUTO: 0.9 10*3/MM3 (ref 0.1–0.9)
MONOCYTES NFR BLD AUTO: 8.9 % (ref 5–12)
NEUTROPHILS NFR BLD AUTO: 6.31 10*3/MM3 (ref 1.7–7)
NEUTROPHILS NFR BLD AUTO: 62.2 % (ref 42.7–76)
NITRITE UR QL STRIP: NEGATIVE
NRBC BLD AUTO-RTO: 0 /100 WBC (ref 0–0.2)
OPIATES UR QL: POSITIVE
OXYCODONE UR QL SCN: NEGATIVE
PCP UR QL SCN: NEGATIVE
PH UR STRIP.AUTO: <=5 [PH] (ref 5–8)
PLATELET # BLD AUTO: 330 10*3/MM3 (ref 140–450)
PMV BLD AUTO: 10.8 FL (ref 6–12)
POTASSIUM SERPL-SCNC: 3.5 MMOL/L (ref 3.5–5.2)
PROT SERPL-MCNC: 7.3 G/DL (ref 6–8.5)
PROT UR QL STRIP: ABNORMAL
RBC # BLD AUTO: 4.4 10*6/MM3 (ref 4.14–5.8)
RBC # UR: ABNORMAL /HPF
REF LAB TEST METHOD: ABNORMAL
SARS-COV-2 RNA RESP QL NAA+PROBE: NOT DETECTED
SODIUM SERPL-SCNC: 138 MMOL/L (ref 136–145)
SP GR UR STRIP: 1.03 (ref 1–1.03)
SQUAMOUS #/AREA URNS HPF: ABNORMAL /HPF
UROBILINOGEN UR QL STRIP: ABNORMAL
WBC # BLD AUTO: 10.14 10*3/MM3 (ref 3.4–10.8)
WBC UR QL AUTO: ABNORMAL /HPF

## 2021-01-18 PROCEDURE — 80053 COMPREHEN METABOLIC PANEL: CPT | Performed by: STUDENT IN AN ORGANIZED HEALTH CARE EDUCATION/TRAINING PROGRAM

## 2021-01-18 PROCEDURE — 82077 ASSAY SPEC XCP UR&BREATH IA: CPT | Performed by: STUDENT IN AN ORGANIZED HEALTH CARE EDUCATION/TRAINING PROGRAM

## 2021-01-18 PROCEDURE — 80307 DRUG TEST PRSMV CHEM ANLYZR: CPT | Performed by: STUDENT IN AN ORGANIZED HEALTH CARE EDUCATION/TRAINING PROGRAM

## 2021-01-18 PROCEDURE — 87636 SARSCOV2 & INF A&B AMP PRB: CPT | Performed by: STUDENT IN AN ORGANIZED HEALTH CARE EDUCATION/TRAINING PROGRAM

## 2021-01-18 PROCEDURE — 83735 ASSAY OF MAGNESIUM: CPT | Performed by: STUDENT IN AN ORGANIZED HEALTH CARE EDUCATION/TRAINING PROGRAM

## 2021-01-18 PROCEDURE — 99284 EMERGENCY DEPT VISIT MOD MDM: CPT

## 2021-01-18 PROCEDURE — 36415 COLL VENOUS BLD VENIPUNCTURE: CPT

## 2021-01-18 PROCEDURE — 81001 URINALYSIS AUTO W/SCOPE: CPT | Performed by: STUDENT IN AN ORGANIZED HEALTH CARE EDUCATION/TRAINING PROGRAM

## 2021-01-18 PROCEDURE — 85025 COMPLETE CBC W/AUTO DIFF WBC: CPT | Performed by: STUDENT IN AN ORGANIZED HEALTH CARE EDUCATION/TRAINING PROGRAM

## 2021-01-18 PROCEDURE — C9803 HOPD COVID-19 SPEC COLLECT: HCPCS

## 2021-01-18 NOTE — NURSING NOTE
Pt states he needs detox from Heroin, states he uses 0.25-0.5 gram daily for the last 1 month via snorting with his last dose being 8 hours ago.  Pt states his current craving level is 4/10 at this time.     Pt also states he is prescribed Suboxone from a clinic and has not been taking them.    Pt denies any SI/HI/AVH.     Pt rates depression 2/10 and anxiety 2/10 at this time.     COWS is a 1 at this time.

## 2021-01-18 NOTE — ED PROVIDER NOTES
Subjective   History of Present Illness  Is here today for evaluation of substance abuse.  Is a history of asthma, rheumatoid arthritis,    Patient is here today says he has been using heroin but it would like to stop using heroin.  He denies any other illicit drug use.  He says he does not drink alcohol.  He says that he does not take benzodiazepines.  He says he does not use methamphetamine.  He has no other medical complaints.  Does not have any chest pain swelling fever.  Is not having any withdrawal symptoms at this time.  Not have any GI pain diarrhea abdominal pain constipation nausea or vomiting.  Is not tremulous.  Not diaphoretic.  Nervous or anxious.  Not suicidal ideation.  Denies homicidal ideation.  He denies hallucinations.    Review of Systems   Constitutional: Negative.    HENT: Negative.    Eyes: Negative.    Respiratory: Negative.    Cardiovascular: Negative.    Gastrointestinal: Negative.    Endocrine: Negative.    Genitourinary: Negative.    Musculoskeletal: Negative.    Skin: Negative.    Allergic/Immunologic: Negative.    Neurological: Negative.    Hematological: Negative.    Psychiatric/Behavioral: Negative.         Substance abuse       Past Medical History:   Diagnosis Date   • Asthma     Patient reported childhood asthma   • History of chest pain     Patient reported many years ago and that he was assessed in ER and released and told related to GERD   • Injury of back    • Insomnia    • Rheumatoid arthritis (CMS/[facility])    • Tattoos    • Wears glasses    • Wears partial dentures     Partial in upper mouth - advised no adhesives DOS       Allergies   Allergen Reactions   • Penicillins Anaphylaxis       Past Surgical History:   Procedure Laterality Date   • COLONOSCOPY     • COLONOSCOPY N/A 8/12/2020    Procedure: COLONOSCOPY;  Surgeon: Michael Vivar MD;  Location: [facility] ENDOSCOPY;  Service: Gastroenterology;  Laterality: N/A;       Family History   Problem Relation Age of Onset   •  Cancer Other        Social History     Socioeconomic History   • Marital status:      Spouse name: Not on file   • Number of children: Not on file   • Years of education: Not on file   • Highest education level: Not on file   Tobacco Use   • Smoking status: Current Every Day Smoker     Packs/day: 0.50     Years: 12.00     Pack years: 6.00     Types: Cigarettes   • Smokeless tobacco: Current User     Types: Snuff   • Tobacco comment: Uses snuff packets   Substance and Sexual Activity   • Alcohol use: Yes     Comment: Social - reported no history of ETOH abuse   • Drug use: No     Comment: Patient denies history of illegal substance abuse (patient reported on suboxone for the past 6 years secondary to back pain - denied any hx of substance abuse)   • Sexual activity: Defer           Objective   Physical Exam  Vitals signs and nursing note reviewed.   Constitutional:       Appearance: Normal appearance. He is well-developed.   HENT:      Head: Normocephalic and atraumatic.      Right Ear: External ear normal.      Left Ear: External ear normal.      Nose: Nose normal.      Mouth/Throat:      Mouth: Mucous membranes are moist.      Pharynx: Oropharynx is clear.   Eyes:      Extraocular Movements: Extraocular movements intact.      Pupils: Pupils are equal, round, and reactive to light.   Neck:      Musculoskeletal: Normal range of motion and neck supple.   Cardiovascular:      Rate and Rhythm: Normal rate and regular rhythm.      Heart sounds: Normal heart sounds.   Pulmonary:      Effort: Pulmonary effort is normal.      Breath sounds: Normal breath sounds.   Abdominal:      General: Bowel sounds are normal.      Palpations: Abdomen is soft.   Musculoskeletal: Normal range of motion.   Skin:     General: Skin is warm and dry.      Capillary Refill: Capillary refill takes less than 2 seconds.   Neurological:      General: No focal deficit present.      Mental Status: He is alert and oriented to person, place, and  time.   Psychiatric:         Behavior: Behavior normal.         Thought Content: Thought content normal.         Judgment: Judgment normal.         Procedures           ED Course  ED Course as of Jan 18 0219   Mon Jan 18, 2021   0218 Patient resting comfortably no signs of agitation.    [JM]   0218 Patient suddenly becomes irritable.  He says that he would like to be signed out AGAINST MEDICAL ADVICE.  He is not suicidal.  He is alert and oriented.  He has capacity to make medical decisions at this time.  He is not going to drive.  Patient is signing out AMA now.  He understands that this is against my medical advice therefore he runs a serious risk of having deterioration.  His condition could worsen.  He could have serious signs of withdrawal resulting in seizures, severe abdominal pain, or even death.    [JM]      ED Course User Index  [JM] Jim Brooks DO                                           MDM  Number of Diagnoses or Management Options  Substance abuse (CMS/[facility]): established and worsening  Diagnosis management comments: Patient had an eventful stay while in the emergency department.  He is not meeting criteria for psychiatric admission.  Do not feel that he is a harm to himself or to others at this time.  They would recommend that the patient be discharged.  Laboratory evaluation is not concerning for infection       Amount and/or Complexity of Data Reviewed  Clinical lab tests: reviewed and ordered  Tests in the radiology section of CPT®: ordered and reviewed  Tests in the medicine section of CPT®: ordered and reviewed  Decide to obtain previous medical records or to obtain history from someone other than the patient: yes  Obtain history from someone other than the patient: yes  Review and summarize past medical records: yes  Discuss the patient with other providers: yes  Independent visualization of images, tracings, or specimens: yes    Risk of Complications, Morbidity, and/or  Mortality  Presenting problems: moderate  Diagnostic procedures: moderate  Management options: moderate    Patient Progress  Patient progress: stable      Final diagnoses:   Substance abuse (CMS/[facility])            Jim Brooks,   01/18/21 0220

## 2021-01-18 NOTE — NURSING NOTE
Presented pt to Dr. Summers. New orders to discharge patient at this time. Instructed patient to come back if symptoms worsen.

## 2021-01-18 NOTE — DISCHARGE INSTRUCTIONS
Return the emergency department if you continue to have problems.  If you develop any new symptoms please come immediately back to emergency department for follow-up.

## 2021-01-18 NOTE — NURSING NOTE
Pt arrived in intake, searched with two staff members present, pt's belongings placed in safe storage, safety precautions in place, pt advised to wear mask and remain 6 ft from others.

## 2022-11-23 NOTE — H&P
[facility]   HISTORY AND PHYSICAL      Name:  Kenan Miranda   Age:  49 y.o.  Sex:  male  :  1970  MRN:  [id number]   Visit Number:  [id number]  Admission Date:  2020  Date Of Service:  20  Primary Care Physician:  Manuel Chi MD    Chief Complaint:     I need a colonoscopy    History Of Presenting Illness:      Patient here for screening colonoscopy.  He has a strong family history of colon cancer as his mother was previously diagnosed with colorectal cancer.  His last colonoscopy was 5 or 6 years ago.  He may have had 2 polyps at that time.  He has no GI complaints.    Review Of Systems:     General ROS: Patient denies any fevers, chills or loss of consciousness.  No complaints of generalized weakness  Psychological ROS: Denies any hallucinations and delusions.  Respiratory ROS: Denies cough or shortness of breath.   Cardiovascular ROS: Denies chest pain or palpitations. No history of exertional chest pain.   Gastrointestinal ROS: Denies nausea and vomiting. Denies any abdominal pain. No diarrhea.   Genito-Urinary ROS: Denies dysuria or hematuria.  Neurological ROS: Denies any focal weakness. No loss of consciousness. Denies any numbness.   Dermatological ROS: Denies any redness or pruritis.     Past Medical History:    Past Medical History:   Diagnosis Date   • Asthma     Patient reported childhood asthma   • History of chest pain     Patient reported many years ago and that he was assessed in ER and released and told related to GERD   • Injury of back    • Insomnia    • Rheumatoid arthritis (CMS/HCC)    • Tattoos    • Wears glasses    • Wears partial dentures     Partial in upper mouth - advised no adhesives DOS       Past Surgical history:    Past Surgical History:   Procedure Laterality Date   • COLONOSCOPY         Social History:    Social History     Socioeconomic History   • Marital status:      Spouse name: Not on file   • Number of children:  Not on file   • Years of education: Not on file   • Highest education level: Not on file   Tobacco Use   • Smoking status: Current Every Day Smoker     Packs/day: 0.50     Years: 12.00     Pack years: 6.00     Types: Cigarettes   • Smokeless tobacco: Current User     Types: Snuff   • Tobacco comment: Uses snuff packets   Substance and Sexual Activity   • Alcohol use: Yes     Comment: Social - reported no history of ETOH abuse   • Drug use: No     Comment: Patient denies history of illegal substance abuse (patient reported on suboxone for the past 6 years secondary to back pain - denied any hx of substance abuse)   • Sexual activity: Defer       Family History:    Family History   Problem Relation Age of Onset   • Cancer Other        Allergies:      Penicillins    Home Medications:    Prior to Admission Medications     Prescriptions Last Dose Informant Patient Reported? Taking?    bisacodyl (bisacodyl) 5 MG EC tablet 8/11/2020 Self No Yes    Take 2 at 3pm and 2 at 7pm the day prior to colonoscopy.    Patient taking differently:  Take 10 mg by mouth Take As Directed. Take 2 at 3pm and 2 at 7pm the day prior to colonoscopy.    polyethylene glycol (MIRALAX) 17 GM/SCOOP powder 8/11/2020 Self No Yes    Mix 238g powder with 64 oz of clear liquid. Starting at 5pm drink 80z every 10-15 minutes until consumed.    Patient taking differently:  Take  by mouth Take As Directed. Mix 238g powder with 64 oz of clear liquid. Starting at 5pm drink 80z every 10-15 minutes until consumed.    SUBOXONE 8-2 MG film film 8/12/2020 Self Yes Yes    Place 1 film under the tongue Daily.    acetaminophen (TYLENOL 8 HOUR) 650 MG 8 hr tablet More than a month Self No No    Take 1 tablet by mouth Every 8 (Eight) Hours As Needed for Mild Pain .    ibuprofen (ADVIL,MOTRIN) 600 MG tablet More than a month Self No No    Take 1 tablet by mouth Every 8 (Eight) Hours As Needed for Mild Pain .             ED Medications:    Medications   sodium chloride  0.9 % flush 10 mL (has no administration in time range)   lactated ringers infusion 1,000 mL (1,000 mL Intravenous New Bag 8/12/20 0650)       Vital Signs:    Temp:  [98.7 °F (37.1 °C)] 98.7 °F (37.1 °C)  Heart Rate:  [65] 65  Resp:  [18] 18  BP: (100)/(69) 100/69        08/10/20  1238   Weight: 67.1 kg (148 lb)       Body mass index is 22.5 kg/m².    Physical Exam:      General Appearance:  Alert and cooperative, not in any acute distress.   Head:  Atraumatic and normocephalic, without obvious abnormality.   Eyes:          PERRLA, conjunctivae and sclerae normal, no Icterus. No pallor. Extra-occular movements are within normal limits.   Ears:  Ears appear intact with no abnormalities noted.   Respiratory/Lungs:   Breath sounds heard bilaterally equally.  No crackles or wheezing. No Pleural rub or bronchial breathing. Normal respiratory effort.    Cardiovascular/Heart:  Normal S1 and S2,    GI/Abdomen:   No masses, no hepatosplenomegaly. Soft, non-tender, non-distended, no hernia                 Musculoskeletal/ Extremities:   Moves all extremities well   Skin: No bleeding, bruising or rash, no induration   Psychiatric : Alert and oriented ×3.  No depression or anxiety    Neurologic: Cranial nerves 2 - 12 grossly intact, sensation intact, Motor power is normal and equal bilaterally.       EKG:          Labs:    Lab Results (last 24 hours)     ** No results found for the last 24 hours. **          Radiology:    Imaging Results (Last 72 Hours)     ** No results found for the last 72 hours. **          Assessment:    Screening colonoscopy with strong family history of colon cancer    Plan:     I recommend a screening colonoscopy to the patient.  The patient understands the procedure and the reason for the procedure.  The patient also understands the risks which include but are not limited to bleeding and perforation and they understand the ramifications of these potential complications and wish to proceed.    Michael  MD Ghazala  08/12/20  08:22   This was a shared visit with the [facility]. I reviewed and verified the documentation and independently performed the documented:

## 2022-12-10 ENCOUNTER — APPOINTMENT (OUTPATIENT)
Dept: GENERAL RADIOLOGY | Facility: HOSPITAL | Age: 52
End: 2022-12-10

## 2022-12-10 ENCOUNTER — HOSPITAL ENCOUNTER (EMERGENCY)
Facility: HOSPITAL | Age: 52
Discharge: HOME | End: 2022-12-10
Attending: EMERGENCY MEDICINE | Admitting: EMERGENCY MEDICINE

## 2022-12-10 ENCOUNTER — APPOINTMENT (OUTPATIENT)
Dept: CT IMAGING | Facility: HOSPITAL | Age: 52
End: 2022-12-10

## 2022-12-10 VITALS
DIASTOLIC BLOOD PRESSURE: 88 MMHG | WEIGHT: 160 LBS | OXYGEN SATURATION: 100 % | TEMPERATURE: 99 F | RESPIRATION RATE: 18 BRPM | SYSTOLIC BLOOD PRESSURE: 118 MMHG | HEIGHT: 67 IN | BODY MASS INDEX: 25.11 KG/M2 | HEART RATE: 75 BPM

## 2022-12-10 DIAGNOSIS — R07.81 PLEURITIC CHEST PAIN: ICD-10-CM

## 2022-12-10 DIAGNOSIS — I30.9 ACUTE PERICARDITIS, UNSPECIFIED TYPE: Primary | ICD-10-CM

## 2022-12-10 PROBLEM — F17.200 CURRENT SMOKER: Status: ACTIVE | Noted: 2022-12-10

## 2022-12-10 PROBLEM — Z80.0 FAMILY HISTORY OF COLON CANCER REQUIRING SCREENING COLONOSCOPY: Status: RESOLVED | Noted: 2020-07-13 | Resolved: 2022-12-10

## 2022-12-10 PROBLEM — M65.331 TRIGGER MIDDLE FINGER OF RIGHT HAND: Status: RESOLVED | Noted: 2018-08-03 | Resolved: 2022-12-10

## 2022-12-10 LAB
ALBUMIN SERPL-MCNC: 4.5 G/DL (ref 3.5–5.2)
ALBUMIN/GLOB SERPL: 1.5 G/DL
ALP SERPL-CCNC: 111 U/L (ref 39–117)
ALT SERPL W P-5'-P-CCNC: 18 U/L (ref 1–41)
AMPHET+METHAMPHET UR QL: NEGATIVE
AMPHETAMINES UR QL: POSITIVE
ANION GAP SERPL CALCULATED.3IONS-SCNC: 10.5 MMOL/L (ref 5–15)
AST SERPL-CCNC: 16 U/L (ref 1–40)
BARBITURATES UR QL SCN: NEGATIVE
BASOPHILS # BLD AUTO: 0.03 10*3/MM3 (ref 0–0.2)
BASOPHILS NFR BLD AUTO: 0.2 % (ref 0–1.5)
BENZODIAZ UR QL SCN: NEGATIVE
BILIRUB SERPL-MCNC: 0.6 MG/DL (ref 0–1.2)
BUN SERPL-MCNC: 7 MG/DL (ref 6–20)
BUN/CREAT SERPL: 9.7 (ref 7–25)
BUPRENORPHINE SERPL-MCNC: NEGATIVE NG/ML
CALCIUM SPEC-SCNC: 9.5 MG/DL (ref 8.6–10.5)
CANNABINOIDS SERPL QL: POSITIVE
CHLORIDE SERPL-SCNC: 100 MMOL/L (ref 98–107)
CO2 SERPL-SCNC: 26.5 MMOL/L (ref 22–29)
COCAINE UR QL: NEGATIVE
CREAT SERPL-MCNC: 0.72 MG/DL (ref 0.76–1.27)
CRP SERPL-MCNC: 5.38 MG/DL (ref 0–0.5)
D DIMER PPP FEU-MCNC: 0.71 MCGFEU/ML (ref 0–0.51)
D-LACTATE SERPL-SCNC: 0.9 MMOL/L (ref 0.5–2)
DEPRECATED RDW RBC AUTO: 46.5 FL (ref 37–54)
EGFRCR SERPLBLD CKD-EPI 2021: 110.6 ML/MIN/1.73
EOSINOPHIL # BLD AUTO: 0.18 10*3/MM3 (ref 0–0.4)
EOSINOPHIL NFR BLD AUTO: 1.4 % (ref 0.3–6.2)
ERYTHROCYTE [DISTWIDTH] IN BLOOD BY AUTOMATED COUNT: 13.9 % (ref 12.3–15.4)
ERYTHROCYTE [SEDIMENTATION RATE] IN BLOOD: 26 MM/HR (ref 0–15)
FLUAV RNA RESP QL NAA+PROBE: NOT DETECTED
FLUBV RNA RESP QL NAA+PROBE: NOT DETECTED
GLOBULIN UR ELPH-MCNC: 3.1 GM/DL
GLUCOSE SERPL-MCNC: 148 MG/DL (ref 65–99)
HCT VFR BLD AUTO: 39.9 % (ref 37.5–51)
HGB BLD-MCNC: 14.1 G/DL (ref 13–17.7)
HOLD SPECIMEN: NORMAL
HOLD SPECIMEN: NORMAL
IMM GRANULOCYTES # BLD AUTO: 0.04 10*3/MM3 (ref 0–0.05)
IMM GRANULOCYTES NFR BLD AUTO: 0.3 % (ref 0–0.5)
LYMPHOCYTES # BLD AUTO: 1.22 10*3/MM3 (ref 0.7–3.1)
LYMPHOCYTES NFR BLD AUTO: 9.6 % (ref 19.6–45.3)
MCH RBC QN AUTO: 31.8 PG (ref 26.6–33)
MCHC RBC AUTO-ENTMCNC: 35.3 G/DL (ref 31.5–35.7)
MCV RBC AUTO: 90.1 FL (ref 79–97)
METHADONE UR QL SCN: NEGATIVE
MONOCYTES # BLD AUTO: 1.39 10*3/MM3 (ref 0.1–0.9)
MONOCYTES NFR BLD AUTO: 10.9 % (ref 5–12)
NEUTROPHILS NFR BLD AUTO: 77.6 % (ref 42.7–76)
NEUTROPHILS NFR BLD AUTO: 9.84 10*3/MM3 (ref 1.7–7)
NRBC BLD AUTO-RTO: 0 /100 WBC (ref 0–0.2)
NT-PROBNP SERPL-MCNC: 187.4 PG/ML (ref 0–900)
OPIATES UR QL: NEGATIVE
OXYCODONE UR QL SCN: NEGATIVE
PCP UR QL SCN: NEGATIVE
PLATELET # BLD AUTO: 341 10*3/MM3 (ref 140–450)
PMV BLD AUTO: 9.9 FL (ref 6–12)
POTASSIUM SERPL-SCNC: 4.1 MMOL/L (ref 3.5–5.2)
PROCALCITONIN SERPL-MCNC: 0.04 NG/ML (ref 0–0.25)
PROPOXYPH UR QL: NEGATIVE
PROT SERPL-MCNC: 7.6 G/DL (ref 6–8.5)
RBC # BLD AUTO: 4.43 10*6/MM3 (ref 4.14–5.8)
SARS-COV-2 RNA RESP QL NAA+PROBE: NOT DETECTED
SODIUM SERPL-SCNC: 137 MMOL/L (ref 136–145)
TRICYCLICS UR QL SCN: NEGATIVE
TROPONIN T SERPL-MCNC: <0.01 NG/ML (ref 0–0.03)
TROPONIN T SERPL-MCNC: <0.01 NG/ML (ref 0–0.03)
WBC NRBC COR # BLD: 12.7 10*3/MM3 (ref 3.4–10.8)
WHOLE BLOOD HOLD COAG: NORMAL
WHOLE BLOOD HOLD SPECIMEN: NORMAL

## 2022-12-10 PROCEDURE — 25010000002 IOPAMIDOL 61 % SOLUTION: Performed by: EMERGENCY MEDICINE

## 2022-12-10 PROCEDURE — 80053 COMPREHEN METABOLIC PANEL: CPT | Performed by: EMERGENCY MEDICINE

## 2022-12-10 PROCEDURE — 84484 ASSAY OF TROPONIN QUANT: CPT | Performed by: EMERGENCY MEDICINE

## 2022-12-10 PROCEDURE — 85379 FIBRIN DEGRADATION QUANT: CPT | Performed by: EMERGENCY MEDICINE

## 2022-12-10 PROCEDURE — 87636 SARSCOV2 & INF A&B AMP PRB: CPT | Performed by: EMERGENCY MEDICINE

## 2022-12-10 PROCEDURE — 87040 BLOOD CULTURE FOR BACTERIA: CPT | Performed by: EMERGENCY MEDICINE

## 2022-12-10 PROCEDURE — 86140 C-REACTIVE PROTEIN: CPT | Performed by: EMERGENCY MEDICINE

## 2022-12-10 PROCEDURE — 84145 PROCALCITONIN (PCT): CPT | Performed by: EMERGENCY MEDICINE

## 2022-12-10 PROCEDURE — 96376 TX/PRO/DX INJ SAME DRUG ADON: CPT

## 2022-12-10 PROCEDURE — 71045 X-RAY EXAM CHEST 1 VIEW: CPT

## 2022-12-10 PROCEDURE — 96374 THER/PROPH/DIAG INJ IV PUSH: CPT

## 2022-12-10 PROCEDURE — 80306 DRUG TEST PRSMV INSTRMNT: CPT | Performed by: EMERGENCY MEDICINE

## 2022-12-10 PROCEDURE — 71275 CT ANGIOGRAPHY CHEST: CPT

## 2022-12-10 PROCEDURE — 83605 ASSAY OF LACTIC ACID: CPT | Performed by: EMERGENCY MEDICINE

## 2022-12-10 PROCEDURE — 85025 COMPLETE CBC W/AUTO DIFF WBC: CPT | Performed by: EMERGENCY MEDICINE

## 2022-12-10 PROCEDURE — 36415 COLL VENOUS BLD VENIPUNCTURE: CPT

## 2022-12-10 PROCEDURE — 99284 EMERGENCY DEPT VISIT MOD MDM: CPT

## 2022-12-10 PROCEDURE — 83880 ASSAY OF NATRIURETIC PEPTIDE: CPT | Performed by: EMERGENCY MEDICINE

## 2022-12-10 PROCEDURE — 25010000002 KETOROLAC TROMETHAMINE PER 15 MG: Performed by: EMERGENCY MEDICINE

## 2022-12-10 PROCEDURE — 85651 RBC SED RATE NONAUTOMATED: CPT | Performed by: EMERGENCY MEDICINE

## 2022-12-10 PROCEDURE — 93005 ELECTROCARDIOGRAM TRACING: CPT | Performed by: EMERGENCY MEDICINE

## 2022-12-10 PROCEDURE — 99283 EMERGENCY DEPT VISIT LOW MDM: CPT | Performed by: INTERNAL MEDICINE

## 2022-12-10 RX ORDER — KETOROLAC TROMETHAMINE 30 MG/ML
15 INJECTION, SOLUTION INTRAMUSCULAR; INTRAVENOUS ONCE
Status: COMPLETED | OUTPATIENT
Start: 2022-12-10 | End: 2022-12-10

## 2022-12-10 RX ORDER — IBUPROFEN 600 MG/1
600 TABLET ORAL EVERY 8 HOURS
Qty: 21 TABLET | Refills: 0 | Status: SHIPPED | OUTPATIENT
Start: 2022-12-10 | End: 2022-12-17

## 2022-12-10 RX ORDER — COLCHICINE 0.6 MG/1
0.6 TABLET ORAL 2 TIMES DAILY
Qty: 180 TABLET | Refills: 0 | Status: SHIPPED | OUTPATIENT
Start: 2022-12-10 | End: 2023-03-10

## 2022-12-10 RX ORDER — SODIUM CHLORIDE 0.9 % (FLUSH) 0.9 %
10 SYRINGE (ML) INJECTION AS NEEDED
Status: DISCONTINUED | OUTPATIENT
Start: 2022-12-10 | End: 2022-12-10 | Stop reason: HOSPADM

## 2022-12-10 RX ORDER — KETOROLAC TROMETHAMINE 30 MG/ML
30 INJECTION, SOLUTION INTRAMUSCULAR; INTRAVENOUS ONCE
Status: COMPLETED | OUTPATIENT
Start: 2022-12-10 | End: 2022-12-10

## 2022-12-10 RX ORDER — ASPIRIN 325 MG
325 TABLET ORAL ONCE
Status: COMPLETED | OUTPATIENT
Start: 2022-12-10 | End: 2022-12-10

## 2022-12-10 RX ORDER — ACETAMINOPHEN 325 MG/1
975 TABLET ORAL ONCE
Status: COMPLETED | OUTPATIENT
Start: 2022-12-10 | End: 2022-12-10

## 2022-12-10 RX ADMIN — ACETAMINOPHEN 975 MG: 325 TABLET, FILM COATED ORAL at 13:31

## 2022-12-10 RX ADMIN — SODIUM CHLORIDE 1000 ML: 9 INJECTION, SOLUTION INTRAVENOUS at 13:30

## 2022-12-10 RX ADMIN — KETOROLAC TROMETHAMINE 30 MG: 30 INJECTION, SOLUTION INTRAMUSCULAR; INTRAVENOUS at 15:58

## 2022-12-10 RX ADMIN — KETOROLAC TROMETHAMINE 15 MG: 30 INJECTION, SOLUTION INTRAMUSCULAR; INTRAVENOUS at 13:31

## 2022-12-10 RX ADMIN — IOPAMIDOL 100 ML: 612 INJECTION, SOLUTION INTRAVENOUS at 14:42

## 2022-12-10 RX ADMIN — ASPIRIN 325 MG ORAL TABLET 325 MG: 325 PILL ORAL at 12:46

## 2022-12-10 NOTE — ED PROVIDER NOTES
Subjective   History of Present Illness  52-year-old male presenting with several complaints.  He states that for the last several days he has had some shoulder, upper back and chest discomfort.  This is gotten progressively worse.  It is worsened by deep inspiration.  There are no particular alleviating factors.  He has had some shortness of breath, congestion and cough as well.  Denies fevers, vomiting or other complaints.  No known sick contacts.  He does smoke but denies any other drug or alcohol use.        Review of Systems   Constitutional: Negative.    HENT: Negative.    Eyes: Negative.    Respiratory: Positive for cough and shortness of breath.    Cardiovascular: Positive for chest pain.   Gastrointestinal: Negative.    Genitourinary: Negative.    Musculoskeletal: Positive for arthralgias and back pain.   Skin: Negative.    Neurological: Negative.    Psychiatric/Behavioral: Negative.        Past Medical History:   Diagnosis Date   • Asthma     Patient reported childhood asthma   • History of chest pain     Patient reported many years ago and that he was assessed in ER and released and told related to GERD   • Injury of back    • Insomnia    • Rheumatoid arthritis (HCC)    • Tattoos    • Wears glasses    • Wears partial dentures     Partial in upper mouth - advised no adhesives DOS       Allergies   Allergen Reactions   • Penicillins Anaphylaxis       Past Surgical History:   Procedure Laterality Date   • COLONOSCOPY     • COLONOSCOPY N/A 8/12/2020    Procedure: COLONOSCOPY;  Surgeon: Michael Vivar MD;  Location: [facility] ENDOSCOPY;  Service: Gastroenterology;  Laterality: N/A;       Family History   Problem Relation Age of Onset   • Cancer Other        Social History     Socioeconomic History   • Marital status:    Tobacco Use   • Smoking status: Every Day     Packs/day: 0.50     Years: 12.00     Pack years: 6.00     Types: Cigarettes   • Smokeless tobacco: Current     Types: Snuff   • Tobacco  comments:     Uses snuff packets   Substance and Sexual Activity   • Alcohol use: Yes     Comment: Social - reported no history of ETOH abuse   • Drug use: No     Comment: Patient denies history of illegal substance abuse (patient reported on suboxone for the past 6 years secondary to back pain - denied any hx of substance abuse)   • Sexual activity: Defer           Objective   Physical Exam  Vitals reviewed.   Constitutional:       General: He is not in acute distress.     Appearance: Normal appearance. He is not ill-appearing, toxic-appearing or diaphoretic.   HENT:      Head: Normocephalic and atraumatic.      Right Ear: External ear normal.      Left Ear: External ear normal.      Nose: Nose normal.      Mouth/Throat:      Mouth: Mucous membranes are moist.      Pharynx: Oropharynx is clear.   Eyes:      Extraocular Movements: Extraocular movements intact.      Conjunctiva/sclera: Conjunctivae normal.      Pupils: Pupils are equal, round, and reactive to light.   Cardiovascular:      Rate and Rhythm: Normal rate and regular rhythm.      Pulses: Normal pulses.      Heart sounds: Normal heart sounds. No murmur heard.    No friction rub. No gallop.   Pulmonary:      Effort: Pulmonary effort is normal. No respiratory distress.      Breath sounds: Normal breath sounds.   Abdominal:      General: Bowel sounds are normal. There is no distension.      Tenderness: There is no abdominal tenderness.   Musculoskeletal:         General: No swelling, tenderness or deformity. Normal range of motion.      Cervical back: Normal range of motion and neck supple.   Skin:     General: Skin is warm and dry.      Capillary Refill: Capillary refill takes less than 2 seconds.      Findings: No rash.   Neurological:      General: No focal deficit present.      Mental Status: He is alert and oriented to person, place, and time.   Psychiatric:         Mood and Affect: Mood normal.         Behavior: Behavior normal.         Procedures            ED Course                                           MDM  Number of Diagnoses or Management Options  Pleuritic chest pain  Diagnosis management comments: 52-year-old male with chest pain, back pain, shortness of breath.  Well-developed, well-nourished man in no distress with exam as above.  His vital signs are normal.  His exam is relatively nonfocal.  Will obtain labs to include D-dimer, viral swab, chest x-ray and EKG.  Will give symptomatic treatment.  Disposition pending.    EKG interpreted by me: Sinus rhythm, normal rate, diffuse mild ST elevation, possible SD depression, no T wave changes, this is an abnormal EKG, no recent EKG for comparison    12:45 EST Abnormal EKG discussed with Dr. Hodge, agreed with my assessment, continue with work-up as planned.    16:22 EST CT scan reveals no acute findings, specifically no pericardial effusion.  Serial enzymes have remained negative.  Inflammatory markers mildly elevated.  Patient feels improved.  Will discharge home with course of colchicine and ibuprofen.  Recommend outpatient follow-up.  He is happy with this plan.      Final diagnoses:   Pleuritic chest pain        Fady Joyce MD  12/10/22 2788

## 2022-12-10 NOTE — CONSULTS
Cardiology ER consult         Reason for consultation:  · Chest pain    Requesting provider: Fady Joyce MD  Consulting provider: Omero Hodge MD    IDENTIFICATION: 52-year-old gentleman from [facility] Problems    Diagnosis  POA   • **Acute pericarditis [I30.9]  Yes     Priority: Medium   • Current smoker [F17.200]  Yes              Kenan Miranda is a 52-year-old gentleman with no cardiac history.  Over the last several days he has been experiencing chest pain nearly constantly.  The chest pain is worse with deep inspiration.  The chest pain has radiated to his back and his left arm.  He has no associated shortness of breath.  The patient had viral illness symptoms maybe 2 weeks ago which were mild.    Allergies   Allergen Reactions   • Penicillins Anaphylaxis       Prior to Admission medications    Medication Sig Start Date End Date Taking? Authorizing Provider   acetaminophen (TYLENOL 8 HOUR) 650 MG 8 hr tablet Take 1 tablet by mouth Every 8 (Eight) Hours As Needed for Mild Pain . 10/9/19   Moiz Goldberg PA-C   ibuprofen (ADVIL,MOTRIN) 600 MG tablet Take 1 tablet by mouth Every 8 (Eight) Hours As Needed for Mild Pain . 10/9/19   Moiz Goldberg PA-C   SUBOXONE 8-2 MG film film Place 1 film under the tongue Daily. 5/6/16   Provider, MD Sil       Past Medical History:   Diagnosis Date   • Asthma     Patient reported childhood asthma   • History of chest pain     Patient reported many years ago and that he was assessed in ER and released and told related to GERD   • Injury of back    • Insomnia    • Rheumatoid arthritis (HCC)    • Tattoos    • Wears glasses    • Wears partial dentures     Partial in upper mouth - advised no adhesives DOS       Past Surgical History:   Procedure Laterality Date   • COLONOSCOPY     • COLONOSCOPY N/A 8/12/2020    Procedure: COLONOSCOPY;  Surgeon: Michael Vivar MD;  Location: [facility] ENDOSCOPY;  Service: Gastroenterology;  Laterality:  N/A;       Family History   Problem Relation Age of Onset   • Cancer Other        Social History     Tobacco Use   Smoking Status Every Day   • Packs/day: 0.50   • Years: 12.00   • Pack years: 6.00   • Types: Cigarettes   Smokeless Tobacco Current   • Types: Snuff   Tobacco Comments    Uses snuff packets       Social History     Substance and Sexual Activity   Alcohol Use Yes    Comment: Social - reported no history of ETOH abuse         Review of Systems:   Review of Systems   Constitutional: Negative for malaise/fatigue.   Eyes: Negative for vision loss in left eye and vision loss in right eye.   Cardiovascular: Positive for chest pain. Negative for dyspnea on exertion, near-syncope, orthopnea, palpitations, paroxysmal nocturnal dyspnea and syncope.   Musculoskeletal: Negative for myalgias.   Neurological: Negative for brief paralysis, excessive daytime sleepiness, focal weakness, numbness, paresthesias and weakness.   All other systems reviewed and are negative.           Vital Sign Min/Max for last 24 hours  Temp  Min: 99 °F (37.2 °C)  Max: 99 °F (37.2 °C)   BP  Min: 106/76  Max: 113/81   Pulse  Min: 93  Max: 116   Resp  Min: 18  Max: 18   SpO2  Min: 96 %  Max: 97 %   No data recorded    No intake or output data in the 24 hours ending 12/10/22 1413        Tele: Sinus    Constitutional:       Appearance: Healthy appearance. Well-developed.   Eyes:      General: Lids are normal. No scleral icterus.     Conjunctiva/sclera: Conjunctivae normal.   HENT:      Head: Normocephalic and atraumatic.   Neck:      Thyroid: No thyromegaly.      Vascular: No carotid bruit or JVD.   Pulmonary:      Effort: Pulmonary effort is normal.      Breath sounds: Normal breath sounds. No wheezing. No rhonchi. No rales.   Cardiovascular:      Normal rate. Regular rhythm.      Murmurs: There is no murmur.      No gallop. No rub.   Pulses:     Intact distal pulses.   Edema:     Peripheral edema absent.   Abdominal:      General: There is no  distension.      Palpations: Abdomen is soft. There is no abdominal mass.   Musculoskeletal:      Cervical back: Normal range of motion. Skin:     General: Skin is warm and dry.      Findings: No rash.   Neurological:      General: No focal deficit present.      Mental Status: Alert and oriented to person, place, and time.      Gait: Gait is intact.   Psychiatric:         Attention and Perception: Attention normal.         Mood and Affect: Mood normal.         Behavior: Behavior normal.              DATA REVIEW:    EKG (12/10/2022): Sinus rhythm.  Diffuse ST elevation and OH depression consistent with pericarditis        Results from last 7 days   Lab Units 12/10/22  1235   SODIUM mmol/L 137   POTASSIUM mmol/L 4.1   CHLORIDE mmol/L 100   BUN mg/dL 7   CREATININE mg/dL 0.72*     Results from last 7 days   Lab Units 12/10/22  1235   TROPONIN T ng/mL <0.010     Results from last 7 days   Lab Units 12/10/22  1235   WBC 10*3/mm3 12.70*   HEMOGLOBIN g/dL 14.1   HEMATOCRIT % 39.9   PLATELETS 10*3/mm3 341       Lab Results   Component Value Date    AST 16 12/10/2022    ALT 18 12/10/2022       Lab Results   Component Value Date    TROPONINT <0.010 12/10/2022     Sed rate 26          Chest pain  · Pleuritic chest pain with EKG findings suggestive of acute pericarditis.  Diagnosis supported by elevated sed rate  · Recommend serial troponin and EKG to rule out myocardial infarction  · Recommend CT chest to rule out pulmonary embolism and evaluate for pericardial effusion         · CT angiogram of the chest to rule out pulmonary embolism and evaluate for pericardial effusion  · If myocardial infarction excluded, recommend treatment for pericarditis as follows  · Toradol 30 mg IV q 6 hours hospitalized  · Ibuprofen 600 mg 3 times daily x 7 days   · Colchicine 0.6 mg twice daily x3 months to reduce chance of recurrent pericarditis      Electronically signed by Ephraim Hodge IV, MD, 12/10/22, 2:13 PM EST.

## 2022-12-15 LAB
BACTERIA SPEC AEROBE CULT: NORMAL
BACTERIA SPEC AEROBE CULT: NORMAL

## 2023-03-02 ENCOUNTER — HOSPITAL ENCOUNTER (OUTPATIENT)
Dept: ULTRASOUND IMAGING | Facility: HOSPITAL | Age: 53
Discharge: HOME | End: 2023-03-02
Admitting: NURSE PRACTITIONER
Payer: MEDICARE

## 2023-03-02 ENCOUNTER — TRANSCRIBE ORDERS (OUTPATIENT)
Dept: ADMINISTRATIVE | Facility: HOSPITAL | Age: 53
End: 2023-03-02
Payer: MEDICARE

## 2023-03-02 ENCOUNTER — TRANSCRIBE ORDERS (OUTPATIENT)
Dept: ULTRASOUND IMAGING | Facility: HOSPITAL | Age: 53
End: 2023-03-02
Payer: MEDICARE

## 2023-03-02 DIAGNOSIS — R10.31 ABDOMINAL PAIN, RIGHT LOWER QUADRANT: Primary | ICD-10-CM

## 2023-03-02 DIAGNOSIS — R10.31 ABDOMINAL PAIN, RIGHT LOWER QUADRANT: ICD-10-CM

## 2023-03-02 PROCEDURE — 76700 US EXAM ABDOM COMPLETE: CPT

## 2023-03-11 ENCOUNTER — APPOINTMENT (OUTPATIENT)
Dept: CT IMAGING | Facility: HOSPITAL | Age: 53
End: 2023-03-11
Payer: MEDICARE

## 2023-03-11 ENCOUNTER — HOSPITAL ENCOUNTER (EMERGENCY)
Facility: HOSPITAL | Age: 53
Discharge: HOME | End: 2023-03-11
Attending: EMERGENCY MEDICINE | Admitting: EMERGENCY MEDICINE
Payer: MEDICARE

## 2023-03-11 VITALS
WEIGHT: 156 LBS | SYSTOLIC BLOOD PRESSURE: 104 MMHG | BODY MASS INDEX: 23.64 KG/M2 | RESPIRATION RATE: 18 BRPM | TEMPERATURE: 98.2 F | DIASTOLIC BLOOD PRESSURE: 84 MMHG | HEIGHT: 68 IN | OXYGEN SATURATION: 95 % | HEART RATE: 58 BPM

## 2023-03-11 DIAGNOSIS — Z87.19 HISTORY OF GALLSTONES: ICD-10-CM

## 2023-03-11 DIAGNOSIS — R10.9 ABDOMINAL PAIN, UNSPECIFIED ABDOMINAL LOCATION: Primary | ICD-10-CM

## 2023-03-11 LAB
ALBUMIN SERPL-MCNC: 4.4 G/DL (ref 3.5–5.2)
ALBUMIN/GLOB SERPL: 1.6 G/DL
ALP SERPL-CCNC: 74 U/L (ref 39–117)
ALT SERPL W P-5'-P-CCNC: 16 U/L (ref 1–41)
ANION GAP SERPL CALCULATED.3IONS-SCNC: 12.1 MMOL/L (ref 5–15)
AST SERPL-CCNC: 14 U/L (ref 1–40)
BASOPHILS # BLD AUTO: 0.02 10*3/MM3 (ref 0–0.2)
BASOPHILS NFR BLD AUTO: 0.2 % (ref 0–1.5)
BILIRUB SERPL-MCNC: 0.2 MG/DL (ref 0–1.2)
BUN SERPL-MCNC: 14 MG/DL (ref 6–20)
BUN/CREAT SERPL: 19.4 (ref 7–25)
CALCIUM SPEC-SCNC: 9.1 MG/DL (ref 8.6–10.5)
CHLORIDE SERPL-SCNC: 99 MMOL/L (ref 98–107)
CO2 SERPL-SCNC: 22.9 MMOL/L (ref 22–29)
CREAT SERPL-MCNC: 0.72 MG/DL (ref 0.76–1.27)
D-LACTATE SERPL-SCNC: 1.4 MMOL/L (ref 0.5–2)
DEPRECATED RDW RBC AUTO: 47.2 FL (ref 37–54)
EGFRCR SERPLBLD CKD-EPI 2021: 110.6 ML/MIN/1.73
EOSINOPHIL # BLD AUTO: 0.16 10*3/MM3 (ref 0–0.4)
EOSINOPHIL NFR BLD AUTO: 1.5 % (ref 0.3–6.2)
ERYTHROCYTE [DISTWIDTH] IN BLOOD BY AUTOMATED COUNT: 14.5 % (ref 12.3–15.4)
GLOBULIN UR ELPH-MCNC: 2.8 GM/DL
GLUCOSE SERPL-MCNC: 124 MG/DL (ref 65–99)
HCT VFR BLD AUTO: 42.4 % (ref 37.5–51)
HGB BLD-MCNC: 14.3 G/DL (ref 13–17.7)
HOLD SPECIMEN: NORMAL
HOLD SPECIMEN: NORMAL
IMM GRANULOCYTES # BLD AUTO: 0.04 10*3/MM3 (ref 0–0.05)
IMM GRANULOCYTES NFR BLD AUTO: 0.4 % (ref 0–0.5)
LIPASE SERPL-CCNC: 95 U/L (ref 13–60)
LYMPHOCYTES # BLD AUTO: 1.58 10*3/MM3 (ref 0.7–3.1)
LYMPHOCYTES NFR BLD AUTO: 14.7 % (ref 19.6–45.3)
MCH RBC QN AUTO: 30 PG (ref 26.6–33)
MCHC RBC AUTO-ENTMCNC: 33.7 G/DL (ref 31.5–35.7)
MCV RBC AUTO: 89.1 FL (ref 79–97)
MONOCYTES # BLD AUTO: 0.82 10*3/MM3 (ref 0.1–0.9)
MONOCYTES NFR BLD AUTO: 7.6 % (ref 5–12)
NEUTROPHILS NFR BLD AUTO: 75.6 % (ref 42.7–76)
NEUTROPHILS NFR BLD AUTO: 8.13 10*3/MM3 (ref 1.7–7)
NRBC BLD AUTO-RTO: 0 /100 WBC (ref 0–0.2)
PLATELET # BLD AUTO: 388 10*3/MM3 (ref 140–450)
PMV BLD AUTO: 10.1 FL (ref 6–12)
POTASSIUM SERPL-SCNC: 4.1 MMOL/L (ref 3.5–5.2)
PROT SERPL-MCNC: 7.2 G/DL (ref 6–8.5)
RBC # BLD AUTO: 4.76 10*6/MM3 (ref 4.14–5.8)
SODIUM SERPL-SCNC: 134 MMOL/L (ref 136–145)
WBC NRBC COR # BLD: 10.75 10*3/MM3 (ref 3.4–10.8)
WHOLE BLOOD HOLD COAG: NORMAL
WHOLE BLOOD HOLD SPECIMEN: NORMAL

## 2023-03-11 PROCEDURE — 96374 THER/PROPH/DIAG INJ IV PUSH: CPT

## 2023-03-11 PROCEDURE — 74176 CT ABD & PELVIS W/O CONTRAST: CPT

## 2023-03-11 PROCEDURE — 96375 TX/PRO/DX INJ NEW DRUG ADDON: CPT

## 2023-03-11 PROCEDURE — 83690 ASSAY OF LIPASE: CPT

## 2023-03-11 PROCEDURE — 25010000002 KETOROLAC TROMETHAMINE PER 15 MG: Performed by: EMERGENCY MEDICINE

## 2023-03-11 PROCEDURE — 80053 COMPREHEN METABOLIC PANEL: CPT

## 2023-03-11 PROCEDURE — 85025 COMPLETE CBC W/AUTO DIFF WBC: CPT

## 2023-03-11 PROCEDURE — 25010000002 ONDANSETRON PER 1 MG: Performed by: EMERGENCY MEDICINE

## 2023-03-11 PROCEDURE — 99284 EMERGENCY DEPT VISIT MOD MDM: CPT

## 2023-03-11 PROCEDURE — 83605 ASSAY OF LACTIC ACID: CPT

## 2023-03-11 RX ORDER — SODIUM CHLORIDE 0.9 % (FLUSH) 0.9 %
10 SYRINGE (ML) INJECTION AS NEEDED
Status: DISCONTINUED | OUTPATIENT
Start: 2023-03-11 | End: 2023-03-11 | Stop reason: HOSPADM

## 2023-03-11 RX ORDER — KETOROLAC TROMETHAMINE 10 MG/1
10 TABLET, FILM COATED ORAL EVERY 6 HOURS PRN
Qty: 20 TABLET | Refills: 0 | Status: SHIPPED | OUTPATIENT
Start: 2023-03-11 | End: 2023-03-22 | Stop reason: HOSPADM

## 2023-03-11 RX ORDER — KETOROLAC TROMETHAMINE 30 MG/ML
30 INJECTION, SOLUTION INTRAMUSCULAR; INTRAVENOUS ONCE
Status: COMPLETED | OUTPATIENT
Start: 2023-03-11 | End: 2023-03-11

## 2023-03-11 RX ORDER — DICYCLOMINE HYDROCHLORIDE 10 MG/1
20 CAPSULE ORAL ONCE
Status: COMPLETED | OUTPATIENT
Start: 2023-03-11 | End: 2023-03-11

## 2023-03-11 RX ORDER — DICYCLOMINE HCL 20 MG
20 TABLET ORAL EVERY 6 HOURS
Qty: 20 TABLET | Refills: 0 | Status: SHIPPED | OUTPATIENT
Start: 2023-03-11 | End: 2023-03-22 | Stop reason: HOSPADM

## 2023-03-11 RX ORDER — ONDANSETRON 2 MG/ML
4 INJECTION INTRAMUSCULAR; INTRAVENOUS ONCE
Status: COMPLETED | OUTPATIENT
Start: 2023-03-11 | End: 2023-03-11

## 2023-03-11 RX ORDER — ONDANSETRON 4 MG/1
4 TABLET, FILM COATED ORAL EVERY 8 HOURS PRN
Qty: 12 TABLET | Refills: 0 | Status: SHIPPED | OUTPATIENT
Start: 2023-03-11 | End: 2023-03-22 | Stop reason: HOSPADM

## 2023-03-11 RX ADMIN — KETOROLAC TROMETHAMINE 30 MG: 30 INJECTION, SOLUTION INTRAMUSCULAR; INTRAVENOUS at 12:21

## 2023-03-11 RX ADMIN — DICYCLOMINE HYDROCHLORIDE 20 MG: 10 CAPSULE ORAL at 14:15

## 2023-03-11 RX ADMIN — ONDANSETRON 4 MG: 2 INJECTION INTRAMUSCULAR; INTRAVENOUS at 12:23

## 2023-03-11 NOTE — ED PROVIDER NOTES
Subjective   History of Present Illness  52-year-old male with abdominal pain.  Patient is complaining of right lower quadrant abdominal pain for 1 week.  States that he had an ultrasound performed that showed he had gallstones.  He states that his pain has moved from his right upper quadrant to his right lower quadrant.  He is now having diarrhea as well.  Some nausea.  No fever or chills.  No chest pain or shortness of breath.  No other complaints this time.        Review of Systems   Constitutional: Negative for fatigue and fever.   Gastrointestinal: Positive for abdominal pain, diarrhea and nausea. Negative for vomiting.   All other systems reviewed and are negative.      Past Medical History:   Diagnosis Date   • Asthma     Patient reported childhood asthma   • History of chest pain     Patient reported many years ago and that he was assessed in ER and released and told related to GERD   • Injury of back    • Insomnia    • Rheumatoid arthritis (HCC)    • Tattoos    • Wears glasses    • Wears partial dentures     Partial in upper mouth - advised no adhesives DOS       Allergies   Allergen Reactions   • Penicillins Anaphylaxis       Past Surgical History:   Procedure Laterality Date   • COLONOSCOPY     • COLONOSCOPY N/A 8/12/2020    Procedure: COLONOSCOPY;  Surgeon: Michael Vivar MD;  Location: [facility] ENDOSCOPY;  Service: Gastroenterology;  Laterality: N/A;       Family History   Problem Relation Age of Onset   • Cancer Other        Social History     Socioeconomic History   • Marital status:    Tobacco Use   • Smoking status: Every Day     Packs/day: 0.50     Years: 12.00     Pack years: 6.00     Types: Cigarettes   • Smokeless tobacco: Current     Types: Snuff   • Tobacco comments:     Uses snuff packets   Substance and Sexual Activity   • Alcohol use: Yes     Comment: Social - reported no history of ETOH abuse   • Drug use: No     Comment: Patient denies history of illegal substance abuse (patient  reported on suboxone for the past 6 years secondary to back pain - denied any hx of substance abuse)   • Sexual activity: Defer           Objective   Physical Exam  Vitals and nursing note reviewed.   Constitutional:       General: He is not in acute distress.     Appearance: He is well-developed. He is not diaphoretic.   HENT:      Head: Normocephalic and atraumatic.      Nose: Nose normal.   Eyes:      Conjunctiva/sclera: Conjunctivae normal.      Pupils: Pupils are equal, round, and reactive to light.   Cardiovascular:      Rate and Rhythm: Normal rate and regular rhythm.   Pulmonary:      Effort: Pulmonary effort is normal. No respiratory distress.      Breath sounds: Normal breath sounds.   Abdominal:      General: There is no distension.      Palpations: Abdomen is soft.      Tenderness: There is generalized abdominal tenderness and tenderness in the right lower quadrant, suprapubic area and left lower quadrant.   Musculoskeletal:         General: No deformity.   Neurological:      Mental Status: He is alert and oriented to person, place, and time.      Cranial Nerves: No cranial nerve deficit.      Coordination: Coordination normal.         Procedures           ED Course                                           MDM  Chief complaint: Abdominal pain    Initial impression of presenting illness: 52-year-old male well-appearing nontoxic with abdominal pain    Comorbidities requiring consideration and/or management:    Differential diagnosis includes but not limited to: Cholelithiasis, acute cholecystitis, biliary colic, diverticulitis, colitis, appendicitis,    Patient arrives hemodynamically stable, afebrile, without respiratory distress with initial vitals interpreted by myself.  Pertinent initial vitals include afebrile normotensive heart rate of 78    Pertinent features from physical exam: Right lower quadrant suprapubic and left lower quadrant abdominal tenderness to palpation.  No guarding, no rigidity,  heart regular rate and rhythm    Initial diagnostic plan: CBC, CMP, lipase, CT abdomen pelvis    Results from initial plan were reviewed and interpreted by myself and include: Lipase slightly elevated at 95, lactic normal at 1.4, CMP is reassuring with normal LFTs.  CBC is reassuring with normal white blood cell count hemoglobin hematocrit and platelets.  CT was negative for acute process did show some increase stool burden.      Diagnostic information from other sources includes: Review of previous visits, prior labs, prior imaging, available notes from prior evaluations or visits with specialists, medication list, allergies, past medical history, past surgical history    Interventions in the ED included: IV Toradol, Zofran    Reevaluation: Resting comfortably, pain is improved    Results/clinical rationale were discussed with patient     Consultations and discussions of results with other physicians: N/A    Discussion of results/management/plan: Work-up is reassuring.  Appropriate for discharge with continued symptomatic management.  Follow-up outpatient as needed.    Disposition plan: Discharge.      Final diagnoses:   Abdominal pain, unspecified abdominal location   History of gallstones       ED Disposition  ED Disposition     ED Disposition   Discharge    Condition   Stable    Comment   --             Manuel Chi MD  [facility]   [address] [address]  [phone]          [facility]  [address] [facility] [address] [phone]    As needed, If symptoms worsen         Medication List      New Prescriptions    dicyclomine 20 MG tablet  Commonly known as: BENTYL  Take 1 tablet by mouth Every 6 (Six) Hours.     ketorolac 10 MG tablet  Commonly known as: TORADOL  Take 1 tablet by mouth Every 6 (Six) Hours As Needed for Moderate Pain.     ondansetron 4 MG tablet  Commonly known as: ZOFRAN  Take 1 tablet by mouth Every 8 (Eight) Hours As Needed for  Nausea or Vomiting.        Stop    colchicine 0.6 MG tablet           Where to Get Your Medications      These medications were sent to [facility] [place], [place] - [address] [facility] - [phone]  - [phone]   [address] [facility] [address]    Phone: [phone]   · dicyclomine 20 MG tablet  · ketorolac 10 MG tablet  · ondansetron 4 MG tablet          Rodrick Davis, DO  03/13/23 0804

## 2023-03-11 NOTE — ED NOTES
Pt provided with a urinal, request for  urine specimen.  Pt reports that he is unable to urinate at this time.

## 2023-03-14 NOTE — PROGRESS NOTES
Patient: Kenan Miranda    YOB: 1970    Date: 03/16/2023    Primary Care Provider: Manuel Chi MD    Chief Complaint   Patient presents with   • Abdominal Pain     Gallstones       SUBJECTIVE:    History of present illness: Over the last month the patient has had intermittent abdominal pain.  He describes both right upper quadrant pain associate with nausea and occasionally in the back.  He also has some right lower quadrant pain as well that occurs with sneezing.  The following portions of the patient's history were reviewed and updated as appropriate: allergies, current medications, past family history, past medical history, past social history, past surgical history and problem list.      Review of Systems   Constitutional: Positive for appetite change. Negative for fatigue and fever.   HENT: Negative for congestion, nosebleeds and postnasal drip.    Eyes: Negative for photophobia.   Respiratory: Negative for cough, choking, chest tightness and shortness of breath.    Cardiovascular: Negative for chest pain, palpitations and leg swelling.   Gastrointestinal: Positive for abdominal pain (right upper quadrant / epigastric pain) and nausea.   Endocrine: Negative for cold intolerance and heat intolerance.   Genitourinary: Negative for flank pain and frequency.   Musculoskeletal: Negative for arthralgias.   Neurological: Negative for seizures, light-headedness, numbness and headaches.   Psychiatric/Behavioral: Negative for agitation, behavioral problems, confusion and hallucinations.       Allergies:  Allergies   Allergen Reactions   • Penicillins Anaphylaxis       Medications:    Current Outpatient Medications:   •  ketorolac (TORADOL) 10 MG tablet, Take 1 tablet by mouth Every 6 (Six) Hours As Needed for Moderate Pain., Disp: 20 tablet, Rfl: 0  •  ondansetron (ZOFRAN) 4 MG tablet, Take 1 tablet by mouth Every 8 (Eight) Hours As Needed for Nausea or Vomiting., Disp: 12 tablet, Rfl: 0  •   "acetaminophen (TYLENOL 8 HOUR) 650 MG 8 hr tablet, Take 1 tablet by mouth Every 8 (Eight) Hours As Needed for Mild Pain ., Disp: 12 tablet, Rfl: 0  •  dicyclomine (BENTYL) 20 MG tablet, Take 1 tablet by mouth Every 6 (Six) Hours. (Patient not taking: Reported on 3/16/2023), Disp: 20 tablet, Rfl: 0  •  SUBOXONE 8-2 MG film film, Place 1 film under the tongue Daily., Disp: , Rfl:     History:  Past Medical History:   Diagnosis Date   • Asthma     Patient reported childhood asthma   • History of chest pain     Patient reported many years ago and that he was assessed in ER and released and told related to GERD   • Injury of back    • Insomnia    • Rheumatoid arthritis (HCC)    • Tattoos    • Wears glasses    • Wears partial dentures     Partial in upper mouth - advised no adhesives DOS       Past Surgical History:   Procedure Laterality Date   • COLONOSCOPY     • COLONOSCOPY N/A 8/12/2020    Procedure: COLONOSCOPY;  Surgeon: Michael Vivar MD;  Location: [facility] ENDOSCOPY;  Service: Gastroenterology;  Laterality: N/A;       Family History   Problem Relation Age of Onset   • Cancer Other        Social History     Tobacco Use   • Smoking status: Every Day     Packs/day: 0.50     Years: 12.00     Pack years: 6.00     Types: Cigarettes   • Smokeless tobacco: Current     Types: Snuff   • Tobacco comments:     Uses snuff packets   Substance Use Topics   • Alcohol use: Yes     Comment: Social - reported no history of ETOH abuse   • Drug use: No     Comment: Patient denies history of illegal substance abuse (patient reported on suboxone for the past 6 years secondary to back pain - denied any hx of substance abuse)        OBJECTIVE:    Vital Signs:   Vitals:    03/16/23 1313   BP: 132/64   Pulse: 76   Resp: 18   Temp: 98 °F (36.7 °C)   TempSrc: Temporal   SpO2: 98%   Weight: 73.4 kg (161 lb 12.8 oz)   Height: 172.7 cm (68\")       Physical Exam:   General Appearance:    Alert, cooperative, in no acute distress   Head:    " Normocephalic, without obvious abnormality, atraumatic   Eyes:            Normal.  No scleral icterus.  PERRLA    Lungs:     Clear to auscultation,respirations regular, even and                  unlabored    Heart:    Regular rhythm and normal rate, normal S1 and S2, no            murmur   Abdomen:     Normal bowel sounds, no masses, no organomegaly, soft        non-tender, non-distended, no guarding,    Extremities:   Moves all extremities well, no edema, no cyanosis, no             redness   Skin:   No bleeding, bruising or rash   Neurologic:   Normal without gross deficits.   Psychiatric: No evidence of depression or anxiety        Results Review:   I reviewed the patient's new clinical results.  CT was reviewed including films and I agree with the interpretation.  Ultrasound was reviewed    Review of Systems was reviewed and confirmed as accurate as documented by the MA.    ASSESSMENT/PLAN:    1. Cholelithiasis without cholecystitis        Patient with symptomatic cholelithiasis.  I offered him both observation with low-fat diet versus a laparoscopic cholecystectomy.  I explained this procedure to them in detail and they understand the procedure.  They also understand the risks of bleeding, infection, bile leak, ductal injury, bowel injury, the possibility of converting to an open procedure etc. They understand all of these risks and I have answered all of their questions and they wish to proceed.  Not sure why he has right lower quadrant pain.  I do not appreciate a hernia or other abnormality on CT scan.  We will continue to observe for now.      Electronically signed by Michael Vivar MD  03/16/23

## 2023-03-14 NOTE — H&P (VIEW-ONLY)
Patient: Kenan Miranda    YOB: 1970    Date: 03/16/2023    Primary Care Provider: Manuel Chi MD    Chief Complaint   Patient presents with   • Abdominal Pain     Gallstones       SUBJECTIVE:    History of present illness: Over the last month the patient has had intermittent abdominal pain.  He describes both right upper quadrant pain associate with nausea and occasionally in the back.  He also has some right lower quadrant pain as well that occurs with sneezing.  The following portions of the patient's history were reviewed and updated as appropriate: allergies, current medications, past family history, past medical history, past social history, past surgical history and problem list.      Review of Systems   Constitutional: Positive for appetite change. Negative for fatigue and fever.   HENT: Negative for congestion, nosebleeds and postnasal drip.    Eyes: Negative for photophobia.   Respiratory: Negative for cough, choking, chest tightness and shortness of breath.    Cardiovascular: Negative for chest pain, palpitations and leg swelling.   Gastrointestinal: Positive for abdominal pain (right upper quadrant / epigastric pain) and nausea.   Endocrine: Negative for cold intolerance and heat intolerance.   Genitourinary: Negative for flank pain and frequency.   Musculoskeletal: Negative for arthralgias.   Neurological: Negative for seizures, light-headedness, numbness and headaches.   Psychiatric/Behavioral: Negative for agitation, behavioral problems, confusion and hallucinations.       Allergies:  Allergies   Allergen Reactions   • Penicillins Anaphylaxis       Medications:    Current Outpatient Medications:   •  ketorolac (TORADOL) 10 MG tablet, Take 1 tablet by mouth Every 6 (Six) Hours As Needed for Moderate Pain., Disp: 20 tablet, Rfl: 0  •  ondansetron (ZOFRAN) 4 MG tablet, Take 1 tablet by mouth Every 8 (Eight) Hours As Needed for Nausea or Vomiting., Disp: 12 tablet, Rfl: 0  •   "acetaminophen (TYLENOL 8 HOUR) 650 MG 8 hr tablet, Take 1 tablet by mouth Every 8 (Eight) Hours As Needed for Mild Pain ., Disp: 12 tablet, Rfl: 0  •  dicyclomine (BENTYL) 20 MG tablet, Take 1 tablet by mouth Every 6 (Six) Hours. (Patient not taking: Reported on 3/16/2023), Disp: 20 tablet, Rfl: 0  •  SUBOXONE 8-2 MG film film, Place 1 film under the tongue Daily., Disp: , Rfl:     History:  Past Medical History:   Diagnosis Date   • Asthma     Patient reported childhood asthma   • History of chest pain     Patient reported many years ago and that he was assessed in ER and released and told related to GERD   • Injury of back    • Insomnia    • Rheumatoid arthritis (HCC)    • Tattoos    • Wears glasses    • Wears partial dentures     Partial in upper mouth - advised no adhesives DOS       Past Surgical History:   Procedure Laterality Date   • COLONOSCOPY     • COLONOSCOPY N/A 8/12/2020    Procedure: COLONOSCOPY;  Surgeon: Michael Vivar MD;  Location: [facility] ENDOSCOPY;  Service: Gastroenterology;  Laterality: N/A;       Family History   Problem Relation Age of Onset   • Cancer Other        Social History     Tobacco Use   • Smoking status: Every Day     Packs/day: 0.50     Years: 12.00     Pack years: 6.00     Types: Cigarettes   • Smokeless tobacco: Current     Types: Snuff   • Tobacco comments:     Uses snuff packets   Substance Use Topics   • Alcohol use: Yes     Comment: Social - reported no history of ETOH abuse   • Drug use: No     Comment: Patient denies history of illegal substance abuse (patient reported on suboxone for the past 6 years secondary to back pain - denied any hx of substance abuse)        OBJECTIVE:    Vital Signs:   Vitals:    03/16/23 1313   BP: 132/64   Pulse: 76   Resp: 18   Temp: 98 °F (36.7 °C)   TempSrc: Temporal   SpO2: 98%   Weight: 73.4 kg (161 lb 12.8 oz)   Height: 172.7 cm (68\")       Physical Exam:   General Appearance:    Alert, cooperative, in no acute distress   Head:    " Normocephalic, without obvious abnormality, atraumatic   Eyes:            Normal.  No scleral icterus.  PERRLA    Lungs:     Clear to auscultation,respirations regular, even and                  unlabored    Heart:    Regular rhythm and normal rate, normal S1 and S2, no            murmur   Abdomen:     Normal bowel sounds, no masses, no organomegaly, soft        non-tender, non-distended, no guarding,    Extremities:   Moves all extremities well, no edema, no cyanosis, no             redness   Skin:   No bleeding, bruising or rash   Neurologic:   Normal without gross deficits.   Psychiatric: No evidence of depression or anxiety        Results Review:   I reviewed the patient's new clinical results.  CT was reviewed including films and I agree with the interpretation.  Ultrasound was reviewed    Review of Systems was reviewed and confirmed as accurate as documented by the MA.    ASSESSMENT/PLAN:    1. Cholelithiasis without cholecystitis        Patient with symptomatic cholelithiasis.  I offered him both observation with low-fat diet versus a laparoscopic cholecystectomy.  I explained this procedure to them in detail and they understand the procedure.  They also understand the risks of bleeding, infection, bile leak, ductal injury, bowel injury, the possibility of converting to an open procedure etc. They understand all of these risks and I have answered all of their questions and they wish to proceed.  Not sure why he has right lower quadrant pain.  I do not appreciate a hernia or other abnormality on CT scan.  We will continue to observe for now.      Electronically signed by Michael Vivar MD  03/16/23

## 2023-03-16 ENCOUNTER — OFFICE VISIT (OUTPATIENT)
Dept: SURGERY | Facility: CLINIC | Age: 53
End: 2023-03-16
Payer: MEDICARE

## 2023-03-16 VITALS
RESPIRATION RATE: 18 BRPM | SYSTOLIC BLOOD PRESSURE: 132 MMHG | BODY MASS INDEX: 24.52 KG/M2 | WEIGHT: 161.8 LBS | HEIGHT: 68 IN | HEART RATE: 76 BPM | OXYGEN SATURATION: 98 % | DIASTOLIC BLOOD PRESSURE: 64 MMHG | TEMPERATURE: 98 F

## 2023-03-16 DIAGNOSIS — K80.20 CHOLELITHIASIS WITHOUT CHOLECYSTITIS: Primary | ICD-10-CM

## 2023-03-16 PROCEDURE — 1159F MED LIST DOCD IN RCRD: CPT | Performed by: SURGERY

## 2023-03-16 PROCEDURE — 1160F RVW MEDS BY RX/DR IN RCRD: CPT | Performed by: SURGERY

## 2023-03-16 PROCEDURE — 99214 OFFICE O/P EST MOD 30 MIN: CPT | Performed by: SURGERY

## 2023-03-16 RX ORDER — SODIUM CHLORIDE 9 MG/ML
100 INJECTION, SOLUTION INTRAVENOUS CONTINUOUS
Status: CANCELLED | OUTPATIENT
Start: 2023-03-16

## 2023-03-17 ENCOUNTER — TELEPHONE (OUTPATIENT)
Dept: SURGERY | Facility: CLINIC | Age: 53
End: 2023-03-17
Payer: MEDICARE

## 2023-03-21 ENCOUNTER — ANESTHESIA EVENT (OUTPATIENT)
Dept: PERIOP | Facility: HOSPITAL | Age: 53
End: 2023-03-21
Payer: MEDICARE

## 2023-03-21 NOTE — ANESTHESIA PREPROCEDURE EVALUATION
Anesthesia Evaluation     Patient summary reviewed and Nursing notes reviewed   no history of anesthetic complications:  NPO Solid Status: > 8 hours  NPO Liquid Status: > 8 hours           Airway   Mallampati: II  TM distance: >3 FB  Neck ROM: full  no difficulty expected and Possible difficult intubation  Dental - normal exam   (+) partials    Pulmonary    (+) a smoker Current Smoked day of surgery, asthma,decreased breath sounds,   Cardiovascular - normal exam    ECG reviewed  Rhythm: regular  Rate: normal    (+) angina ( past hx of cp, no workup),   CAD:  inc risk.      Neuro/Psych  GI/Hepatic/Renal/Endo    (+)  GERD,  diabetes mellitus type 2,     Musculoskeletal     (+) arthralgias, back pain, chronic pain, joint swelling, myalgias,   Abdominal  - normal exam    Abdomen: soft.  Bowel sounds: normal.   Substance History   (+) alcohol use, drug use     OB/GYN          Other   arthritis,      ROS/Med Hx Other: Hx of substance use   Labs reviewed   cxr nad  ekg sr early repolarization                  Anesthesia Plan    ASA 3     general     (Risks and benefits discussed including risk of aspiration, recall and dental damage. All patient questions answered. Will continue with POC.)  intravenous induction     Anesthetic plan, risks, benefits, and alternatives have been provided, discussed and informed consent has been obtained with: patient.  Pre-procedure education provided

## 2023-03-22 ENCOUNTER — HOSPITAL ENCOUNTER (OUTPATIENT)
Facility: HOSPITAL | Age: 53
Setting detail: HOSPITAL OUTPATIENT SURGERY
Discharge: HOME | End: 2023-03-22
Attending: SURGERY | Admitting: SURGERY
Payer: MEDICARE

## 2023-03-22 ENCOUNTER — ANESTHESIA (OUTPATIENT)
Dept: PERIOP | Facility: HOSPITAL | Age: 53
End: 2023-03-22
Payer: MEDICARE

## 2023-03-22 ENCOUNTER — APPOINTMENT (OUTPATIENT)
Dept: GENERAL RADIOLOGY | Facility: HOSPITAL | Age: 53
End: 2023-03-22
Payer: MEDICARE

## 2023-03-22 VITALS
TEMPERATURE: 98.1 F | OXYGEN SATURATION: 97 % | DIASTOLIC BLOOD PRESSURE: 82 MMHG | SYSTOLIC BLOOD PRESSURE: 116 MMHG | HEART RATE: 59 BPM | RESPIRATION RATE: 16 BRPM

## 2023-03-22 DIAGNOSIS — K80.20 CHOLELITHIASIS WITHOUT CHOLECYSTITIS: ICD-10-CM

## 2023-03-22 LAB — GLUCOSE BLDC GLUCOMTR-MCNC: 129 MG/DL (ref 70–130)

## 2023-03-22 PROCEDURE — 88304 TISSUE EXAM BY PATHOLOGIST: CPT

## 2023-03-22 PROCEDURE — 47563 LAPARO CHOLECYSTECTOMY/GRAPH: CPT | Performed by: SURGERY

## 2023-03-22 PROCEDURE — 25010000002 DEXAMETHASONE PER 1 MG: Performed by: NURSE ANESTHETIST, CERTIFIED REGISTERED

## 2023-03-22 PROCEDURE — 25010000002 ONDANSETRON PER 1 MG: Performed by: NURSE ANESTHETIST, CERTIFIED REGISTERED

## 2023-03-22 PROCEDURE — 25010000002 HYDROMORPHONE PER 4 MG: Performed by: NURSE ANESTHETIST, CERTIFIED REGISTERED

## 2023-03-22 PROCEDURE — 82962 GLUCOSE BLOOD TEST: CPT

## 2023-03-22 PROCEDURE — 25010000002 MIDAZOLAM PER 1MG: Performed by: NURSE ANESTHETIST, CERTIFIED REGISTERED

## 2023-03-22 PROCEDURE — 25010000002 SUCCINYLCHOLINE PER 20 MG: Performed by: NURSE ANESTHETIST, CERTIFIED REGISTERED

## 2023-03-22 PROCEDURE — 25510000001 IOPAMIDOL 61 % SOLUTION: Performed by: SURGERY

## 2023-03-22 PROCEDURE — 25010000002 FENTANYL CITRATE (PF) 100 MCG/2ML SOLUTION: Performed by: NURSE ANESTHETIST, CERTIFIED REGISTERED

## 2023-03-22 PROCEDURE — 25010000002 HYDROMORPHONE 1 MG/ML SOLUTION: Performed by: NURSE ANESTHETIST, CERTIFIED REGISTERED

## 2023-03-22 PROCEDURE — 74300 X-RAY BILE DUCTS/PANCREAS: CPT

## 2023-03-22 PROCEDURE — 25010000002 HALOPERIDOL LACTATE PER 5 MG: Performed by: NURSE ANESTHETIST, CERTIFIED REGISTERED

## 2023-03-22 DEVICE — LIGAMAX 5 MM ENDOSCOPIC MULTIPLE CLIP APPLIER
Type: IMPLANTABLE DEVICE | Site: ABDOMEN | Status: FUNCTIONAL
Brand: LIGAMAX

## 2023-03-22 RX ORDER — HYDROMORPHONE HCL 110MG/55ML
PATIENT CONTROLLED ANALGESIA SYRINGE INTRAVENOUS AS NEEDED
Status: DISCONTINUED | OUTPATIENT
Start: 2023-03-22 | End: 2023-03-22 | Stop reason: SURG

## 2023-03-22 RX ORDER — ONDANSETRON 4 MG/1
4 TABLET, FILM COATED ORAL EVERY 8 HOURS PRN
Qty: 8 TABLET | Refills: 1 | Status: SHIPPED | OUTPATIENT
Start: 2023-03-22 | End: 2023-03-27

## 2023-03-22 RX ORDER — IPRATROPIUM BROMIDE AND ALBUTEROL SULFATE 2.5; .5 MG/3ML; MG/3ML
3 SOLUTION RESPIRATORY (INHALATION) ONCE
Status: DISCONTINUED | OUTPATIENT
Start: 2023-03-22 | End: 2023-03-22 | Stop reason: HOSPADM

## 2023-03-22 RX ORDER — LIDOCAINE HYDROCHLORIDE 20 MG/ML
INJECTION, SOLUTION INTRAVENOUS AS NEEDED
Status: DISCONTINUED | OUTPATIENT
Start: 2023-03-22 | End: 2023-03-22 | Stop reason: SURG

## 2023-03-22 RX ORDER — PROMETHAZINE HYDROCHLORIDE 12.5 MG/1
12.5 TABLET ORAL ONCE AS NEEDED
Status: DISCONTINUED | OUTPATIENT
Start: 2023-03-22 | End: 2023-03-22 | Stop reason: HOSPADM

## 2023-03-22 RX ORDER — MIDAZOLAM HYDROCHLORIDE 2 MG/2ML
INJECTION, SOLUTION INTRAMUSCULAR; INTRAVENOUS AS NEEDED
Status: DISCONTINUED | OUTPATIENT
Start: 2023-03-22 | End: 2023-03-22 | Stop reason: SURG

## 2023-03-22 RX ORDER — BUPIVACAINE HYDROCHLORIDE AND EPINEPHRINE 2.5; 5 MG/ML; UG/ML
INJECTION, SOLUTION EPIDURAL; INFILTRATION; INTRACAUDAL; PERINEURAL AS NEEDED
Status: DISCONTINUED | OUTPATIENT
Start: 2023-03-22 | End: 2023-03-22 | Stop reason: HOSPADM

## 2023-03-22 RX ORDER — SODIUM CHLORIDE 9 MG/ML
100 INJECTION, SOLUTION INTRAVENOUS CONTINUOUS
Status: DISCONTINUED | OUTPATIENT
Start: 2023-03-22 | End: 2023-03-22 | Stop reason: HOSPADM

## 2023-03-22 RX ORDER — IBUPROFEN 800 MG/1
800 TABLET ORAL EVERY 6 HOURS PRN
Qty: 20 TABLET | Refills: 0 | Status: SHIPPED | OUTPATIENT
Start: 2023-03-22 | End: 2023-03-27

## 2023-03-22 RX ORDER — DEXAMETHASONE SODIUM PHOSPHATE 4 MG/ML
INJECTION, SOLUTION INTRA-ARTICULAR; INTRALESIONAL; INTRAMUSCULAR; INTRAVENOUS; SOFT TISSUE AS NEEDED
Status: DISCONTINUED | OUTPATIENT
Start: 2023-03-22 | End: 2023-03-22 | Stop reason: SURG

## 2023-03-22 RX ORDER — ONDANSETRON 2 MG/ML
4 INJECTION INTRAMUSCULAR; INTRAVENOUS ONCE
Status: COMPLETED | OUTPATIENT
Start: 2023-03-22 | End: 2023-03-22

## 2023-03-22 RX ORDER — LORAZEPAM 2 MG/ML
1 INJECTION INTRAMUSCULAR ONCE
Status: DISCONTINUED | OUTPATIENT
Start: 2023-03-22 | End: 2023-03-22 | Stop reason: HOSPADM

## 2023-03-22 RX ORDER — SODIUM CHLORIDE, SODIUM LACTATE, POTASSIUM CHLORIDE, CALCIUM CHLORIDE 600; 310; 30; 20 MG/100ML; MG/100ML; MG/100ML; MG/100ML
1000 INJECTION, SOLUTION INTRAVENOUS CONTINUOUS
Status: DISCONTINUED | OUTPATIENT
Start: 2023-03-22 | End: 2023-03-22 | Stop reason: HOSPADM

## 2023-03-22 RX ORDER — FENTANYL CITRATE 50 UG/ML
INJECTION, SOLUTION INTRAMUSCULAR; INTRAVENOUS AS NEEDED
Status: DISCONTINUED | OUTPATIENT
Start: 2023-03-22 | End: 2023-03-22 | Stop reason: SURG

## 2023-03-22 RX ORDER — SUCCINYLCHOLINE CHLORIDE 20 MG/ML
INJECTION INTRAMUSCULAR; INTRAVENOUS AS NEEDED
Status: DISCONTINUED | OUTPATIENT
Start: 2023-03-22 | End: 2023-03-22 | Stop reason: SURG

## 2023-03-22 RX ORDER — LABETALOL HYDROCHLORIDE 5 MG/ML
INJECTION, SOLUTION INTRAVENOUS AS NEEDED
Status: DISCONTINUED | OUTPATIENT
Start: 2023-03-22 | End: 2023-03-22 | Stop reason: SURG

## 2023-03-22 RX ORDER — ROCURONIUM BROMIDE 10 MG/ML
INJECTION, SOLUTION INTRAVENOUS AS NEEDED
Status: DISCONTINUED | OUTPATIENT
Start: 2023-03-22 | End: 2023-03-22 | Stop reason: SURG

## 2023-03-22 RX ORDER — SODIUM CHLORIDE, SODIUM LACTATE, POTASSIUM CHLORIDE, CALCIUM CHLORIDE 600; 310; 30; 20 MG/100ML; MG/100ML; MG/100ML; MG/100ML
INJECTION, SOLUTION INTRAVENOUS CONTINUOUS PRN
Status: DISCONTINUED | OUTPATIENT
Start: 2023-03-22 | End: 2023-03-22 | Stop reason: SURG

## 2023-03-22 RX ORDER — CEFOXITIN SODIUM 2 G/50ML
2 INJECTION, SOLUTION INTRAVENOUS ONCE
Status: DISCONTINUED | OUTPATIENT
Start: 2023-03-22 | End: 2023-03-22

## 2023-03-22 RX ORDER — HYDROCODONE BITARTRATE AND ACETAMINOPHEN 5; 325 MG/1; MG/1
1-2 TABLET ORAL EVERY 4 HOURS PRN
Qty: 10 TABLET | Refills: 0 | Status: SHIPPED | OUTPATIENT
Start: 2023-03-22 | End: 2023-03-22 | Stop reason: HOSPADM

## 2023-03-22 RX ORDER — NEOSTIGMINE METHYLSULFATE 5 MG/5 ML
SYRINGE (ML) INTRAVENOUS AS NEEDED
Status: DISCONTINUED | OUTPATIENT
Start: 2023-03-22 | End: 2023-03-22 | Stop reason: SURG

## 2023-03-22 RX ORDER — HALOPERIDOL 5 MG/ML
INJECTION INTRAMUSCULAR AS NEEDED
Status: DISCONTINUED | OUTPATIENT
Start: 2023-03-22 | End: 2023-03-22 | Stop reason: SURG

## 2023-03-22 RX ORDER — KETAMINE HCL IN NACL, ISO-OSM 100MG/10ML
SYRINGE (ML) INJECTION AS NEEDED
Status: DISCONTINUED | OUTPATIENT
Start: 2023-03-22 | End: 2023-03-22 | Stop reason: SURG

## 2023-03-22 RX ORDER — CLINDAMYCIN PHOSPHATE 900 MG/50ML
900 INJECTION INTRAVENOUS ONCE
Status: COMPLETED | OUTPATIENT
Start: 2023-03-22 | End: 2023-03-22

## 2023-03-22 RX ADMIN — LABETALOL HYDROCHLORIDE 10 MG: 5 INJECTION, SOLUTION INTRAVENOUS at 12:52

## 2023-03-22 RX ADMIN — MIDAZOLAM HYDROCHLORIDE 2 MG: 1 INJECTION, SOLUTION INTRAMUSCULAR; INTRAVENOUS at 12:39

## 2023-03-22 RX ADMIN — HALOPERIDOL LACTATE 0.5 MG: 5 INJECTION, SOLUTION INTRAMUSCULAR at 12:54

## 2023-03-22 RX ADMIN — HYDROMORPHONE HYDROCHLORIDE 0.5 MG: 1 INJECTION, SOLUTION INTRAMUSCULAR; INTRAVENOUS; SUBCUTANEOUS at 15:09

## 2023-03-22 RX ADMIN — HYDROMORPHONE HYDROCHLORIDE 0.5 MG: 2 INJECTION, SOLUTION INTRAMUSCULAR; INTRAVENOUS; SUBCUTANEOUS at 12:52

## 2023-03-22 RX ADMIN — HYDROMORPHONE HYDROCHLORIDE 0.5 MG: 2 INJECTION, SOLUTION INTRAMUSCULAR; INTRAVENOUS; SUBCUTANEOUS at 13:43

## 2023-03-22 RX ADMIN — SODIUM CHLORIDE, POTASSIUM CHLORIDE, SODIUM LACTATE AND CALCIUM CHLORIDE 1000 ML: 600; 310; 30; 20 INJECTION, SOLUTION INTRAVENOUS at 12:06

## 2023-03-22 RX ADMIN — Medication 25 MG: at 12:39

## 2023-03-22 RX ADMIN — HYDROMORPHONE HYDROCHLORIDE 0.5 MG: 1 INJECTION, SOLUTION INTRAMUSCULAR; INTRAVENOUS; SUBCUTANEOUS at 14:57

## 2023-03-22 RX ADMIN — HYDROMORPHONE HYDROCHLORIDE 0.5 MG: 2 INJECTION, SOLUTION INTRAMUSCULAR; INTRAVENOUS; SUBCUTANEOUS at 12:53

## 2023-03-22 RX ADMIN — Medication 4 MG: at 13:35

## 2023-03-22 RX ADMIN — ONDANSETRON 4 MG: 2 SOLUTION INTRAMUSCULAR; INTRAVENOUS at 12:39

## 2023-03-22 RX ADMIN — DEXAMETHASONE SODIUM PHOSPHATE 8 MG: 4 INJECTION, SOLUTION INTRAMUSCULAR; INTRAVENOUS at 12:39

## 2023-03-22 RX ADMIN — FENTANYL CITRATE 100 MCG: 50 INJECTION INTRAMUSCULAR; INTRAVENOUS at 12:39

## 2023-03-22 RX ADMIN — ROCURONIUM BROMIDE 30 MG: 10 INJECTION INTRAVENOUS at 12:39

## 2023-03-22 RX ADMIN — CLINDAMYCIN PHOSPHATE 900 MG: 900 INJECTION, SOLUTION INTRAVENOUS at 12:39

## 2023-03-22 RX ADMIN — HYDROMORPHONE HYDROCHLORIDE 0.5 MG: 2 INJECTION, SOLUTION INTRAMUSCULAR; INTRAVENOUS; SUBCUTANEOUS at 12:45

## 2023-03-22 RX ADMIN — SUCCINYLCHOLINE CHLORIDE 200 MG: 20 INJECTION, SOLUTION INTRAMUSCULAR; INTRAVENOUS at 12:39

## 2023-03-22 RX ADMIN — GLYCOPYRROLATE 0.8 MG: 0.2 INJECTION, SOLUTION INTRAMUSCULAR; INTRAVENOUS at 13:35

## 2023-03-22 RX ADMIN — SODIUM CHLORIDE, POTASSIUM CHLORIDE, SODIUM LACTATE AND CALCIUM CHLORIDE: 600; 310; 30; 20 INJECTION, SOLUTION INTRAVENOUS at 12:09

## 2023-03-22 RX ADMIN — LIDOCAINE HYDROCHLORIDE 60 MG: 20 INJECTION, SOLUTION INTRAVENOUS at 12:39

## 2023-03-22 NOTE — OP NOTE
PATIENT:     Kenan Miranda    DATE OF SURGERY:     3/22/2023    PHYSICIAN:   Michael Vivar MD    REFERRING PHYSICIAN:  Manuel Chi MD     YOB: 1970    PREOPERATIVE DIAGNOSIS:  Chronic cholecystitis due to Cholelithiasis    POSTOPERATIVE DIAGNOSIS:  Chronic cholecystitis due to Cholelithiasis    PROCEDURE:  Laparoscopic cholecystectomy with intraoperative cholangiography.    ANESTHESIA:  General endotracheal.    EBL: Less than 30 mL    Specimen: Gallbladder    Complications: None    OPERATIVE PROCEDURE:  The patient was taken to the operating room, placed in the supine position, and given general endotracheal anesthesia.  The patient was prepped and draped in the normal sterile fashion, and also received preoperative IV antibiotics.  An appropriate timeout was performed by the nursing staff prior to the incision.     Local anesthetic with Marcaine 0.5% was infused locally at all incision sites. An infraumbilical incision was then made to insert a 5 mm Optiview into the peritoneal cavity and the peritoneal cavity was insufflated with carbon dioxide.  This was later exchanged for a 10 mm port.  A separate 5 mm port was inserted in the subxiphoid region  along with  2 right subcostal 5 mm ports.  The gallbladder was well visualized.    Good exposure was obtained, and the gallbladder was grasped at its infundibulum and its fundus and retracted superiorly and laterally.  There were some chronic attachments of fatty tissue to the gallbladder indicative of chronic cholecystitis and these were easily taken down.    Good exposure was obtained and dissection was performed at the junction of the cystic duct and the gallbladder. The cystic duct and cystic artery were identified in the normal manner.  A clip was then placed on the cystic duct proximally and then two clips were placed on the cystic artery proximally and one distally.  Cystic ductotomy was performed.  A separate cholangiogram catheter  had been inserted through a right upper quadrant introducer port and then this was fed into the cystic duct itself and a clip was applied.     Intraoperative cholangiography was then performed under fluoroscopy, carefully evaluating the biliary ductal anatomy.  This was done without difficulty.  No anatomic abnormality was noted.      The cholangiogram catheter was removed.  The cystic duct was clipped twice distally and then divided, as was the cystic artery.  Bovie electrocautery with L-hook was then used to remove the gallbladder from the liver bed.  It was then removed through the umbilical port site. Fascia was closed with a 2-O Vicryl figure of eight suture.    Copious irrigation was used in the patient’s abdominal cavity.  It was clean and dry at this point.  Hemostasis had been well controlled.  Hemostasis was intact and there was no evidence of bilious leakage. Trocars were removed under direct vision and the skin was closed with 4-0 PDS subcuticular stitch along with Steri-Strips for skin reapproximation.  There were no complications.    The patient was stable at this point and subsequently transferred back to the recovery room in stable condition.    Michael Vivar MD    3/22/2023    15:25 EDT

## 2023-03-22 NOTE — ANESTHESIA PROCEDURE NOTES
Airway  Urgency: elective    Date/Time: 3/22/2023 12:38 PM  Airway not difficult    General Information and Staff    Patient location during procedure: OR  CRNA/CAA: Noha Samaniego CRNA    Indications and Patient Condition  Indications for airway management: airway protection    Preoxygenated: yes  Mask difficulty assessment: 1 - vent by mask    Final Airway Details  Final airway type: endotracheal airway      Successful airway: ETT  Cuffed: yes   Successful intubation technique: direct laryngoscopy  Facilitating devices/methods: intubating stylet  Endotracheal tube insertion site: oral  Blade: Mclaughlin  Blade size: 2  ETT size (mm): 7.5  Cormack-Lehane Classification: grade I - full view of glottis  Placement verified by: chest auscultation and capnometry   Cuff volume (mL): 10  Measured from: lips  ETT/EBT  to lips (cm): 22  Number of attempts at approach: 1  Assessment: lips, teeth, and gum same as pre-op and atraumatic intubation    Additional Comments  Airway placed without problems. Dentition and Lips as pre-induction. ETT cuff inflated to minimal occlusive pressure.

## 2023-03-22 NOTE — ANESTHESIA POSTPROCEDURE EVALUATION
Patient: Kenan Miranda    Procedure Summary     Date: 03/22/23 Room / Location: [facility] OR 1 / [facility] OR    Anesthesia Start: 1231 Anesthesia Stop:     Procedure: CHOLECYSTECTOMY LAPAROSCOPIC INTRAOPERATIVE CHOLANGIOGRAPHY (Abdomen) Diagnosis:       Cholelithiasis without cholecystitis      (Cholelithiasis without cholecystitis [K80.20])    Surgeons: Michael Vivar MD Provider: Issa Palmer CRNA    Anesthesia Type: general ASA Status: 3          Anesthesia Type: general    Vitals  No vitals data found for the desired time range.          Post Anesthesia Care and Evaluation    Patient location during evaluation: PHASE II  Patient participation: complete - patient participated  Level of consciousness: awake  Pain score: 0  Pain management: adequate    Airway patency: patent  Anesthetic complications: No anesthetic complications  PONV Status: none  Cardiovascular status: acceptable  Respiratory status: acceptable and face mask  Hydration status: acceptable    Comments: vsss resp spont, reflexes intact, responsive, report given to pacu nurseSee R.N. note for postop vital signs.

## 2023-03-22 NOTE — DISCHARGE INSTRUCTIONS
No pushing, pulling, tugging,  heavy lifting, or strenuous activity.  No major decision making, driving, or drinking alcoholic beverages for 24 hours. ( due to the medications you have  received)  Always use good hand hygiene/washing techniques.  NO driving while taking pain medications.    * if you have an incision:  Check your incision area every day for signs of infection.   Check for:  * more redness, swelling, or pain  *more fluid or blood  *warmth  *pus or bad smell.    To assist you in voiding:  Drink plenty of fluids  Listen to running water while attempting to void.    If you are unable to urinate and you have an uncomfortable urge to void or it has been   6 hours since you were discharged, return to the Emergency Room.    May splint abdominal incision sites when moving, coughing, sneezing, laughing, or increasing activity as comfort measure.      Apply ice to incision sites as directed per physician (ice on usually for 20-30 minutes, remove, and reapply).  Do not use ice continuously.

## 2023-03-24 LAB — REF LAB TEST METHOD: NORMAL

## 2023-03-30 ENCOUNTER — TELEPHONE (OUTPATIENT)
Dept: SURGERY | Facility: CLINIC | Age: 53
End: 2023-03-30
Payer: MEDICARE

## 2023-03-30 NOTE — TELEPHONE ENCOUNTER
Patient called and said his car broke down and can't make it to his appointment today. Patient states he is doing great . Rescheduled him for this Tuesday with Dr Vivar

## 2023-04-04 ENCOUNTER — TELEPHONE (OUTPATIENT)
Dept: SURGERY | Facility: CLINIC | Age: 53
End: 2023-04-04

## 2023-04-04 NOTE — TELEPHONE ENCOUNTER
LVM for patient to call office to reschedule no show appointment on 4/4/2023.   I have personally seen and examined the patient. I have collaborated with and supervised the

## 2024-08-02 NOTE — PROGRESS NOTES
Patient: Kenan Miranda    YOB: 1970    Date: 08/08/2024    Primary Care Provider: Glenny Menezes APRN    Chief Complaint   Patient presents with    Hernia       SUBJECTIVE:    History of present illness: Patient with a 2-month history of left groin pain and now recently has developed swelling in the left groin.  Causes him pain.  No nausea or vomiting.    The following portions of the patient's history were reviewed and updated as appropriate: allergies, current medications, past family history, past medical history, past social history, past surgical history and problem list.      Review of Systems   Constitutional:  Negative for chills, fever and unexpected weight change.   HENT:  Negative for trouble swallowing and voice change.    Eyes:  Negative for visual disturbance.   Respiratory:  Negative for apnea, cough, chest tightness, shortness of breath and wheezing.    Cardiovascular:  Negative for chest pain, palpitations and leg swelling.   Gastrointestinal:  Negative for abdominal distention, abdominal pain, anal bleeding, blood in stool, constipation, diarrhea, nausea, rectal pain and vomiting.   Endocrine: Negative for cold intolerance and heat intolerance.   Genitourinary:  Negative for difficulty urinating, dysuria, flank pain, scrotal swelling and testicular pain.   Musculoskeletal:  Negative for back pain, gait problem and joint swelling.   Skin:  Negative for color change, rash and wound.   Neurological:  Negative for dizziness, syncope, speech difficulty, weakness, numbness and headaches.   Hematological:  Negative for adenopathy. Does not bruise/bleed easily.   Psychiatric/Behavioral:  Negative for confusion. The patient is not nervous/anxious.          Allergies:  Allergies   Allergen Reactions    Penicillins Anaphylaxis     Beta lactam allergy details  Antibiotic reaction: other (anaphylaxis)  Age at reaction: child  Dose to reaction time: unknown  Reason for antibiotic:  "unknown  Epinephrine required for reaction?: unknown  Tolerated antibiotics: unknown           Medications:  No current outpatient medications on file.    History:  Past Medical History:   Diagnosis Date    Asthma     Patient reported childhood asthma    Diabetes mellitus     History of chest pain     Patient reported many years ago and that he was assessed in ER and released and told related to GERD    Injury of back     Insomnia     Pleurisy 01/2023    Rheumatoid arthritis     Tattoos     Wears glasses     Wears partial dentures     Partial in upper mouth - advised no adhesives DOS       Past Surgical History:   Procedure Laterality Date    CHOLECYSTECTOMY WITH INTRAOPERATIVE CHOLANGIOGRAM N/A 3/22/2023    Procedure: CHOLECYSTECTOMY LAPAROSCOPIC INTRAOPERATIVE CHOLANGIOGRAPHY;  Surgeon: Michael Vivar MD;  Location: [facility] OR;  Service: General;  Laterality: N/A;    COLONOSCOPY      COLONOSCOPY N/A 8/12/2020    Procedure: COLONOSCOPY;  Surgeon: Michael Vivar MD;  Location: [facility] ENDOSCOPY;  Service: Gastroenterology;  Laterality: N/A;       Family History   Problem Relation Age of Onset    Cancer Other        Social History     Tobacco Use    Smoking status: Every Day     Current packs/day: 0.25     Average packs/day: 0.3 packs/day for 12.0 years (3.0 ttl pk-yrs)     Types: Cigarettes    Smokeless tobacco: Former     Types: Snuff    Tobacco comments:     Uses snuff packets - not currently    Vaping Use    Vaping status: Never Used   Substance Use Topics    Alcohol use: Not Currently    Drug use: No     Comment: denies        OBJECTIVE:    Vital Signs:   Vitals:    08/08/24 1323   BP: 108/62   Pulse: 90   Temp: 99.6 °F (37.6 °C)   SpO2: 99%   Weight: 60.4 kg (133 lb 3.2 oz)   Height: 172.7 cm (68\")       Physical Exam:       General Appearance:    Alert, cooperative, in no acute distress   Head:    Normocephalic, without obvious abnormality, atraumatic   Eyes:            Normal.  No scleral icterus.  " PERRLA    Lungs:     Clear to auscultation,respirations regular, even and                  unlabored    Heart:    Regular rhythm and normal rate, normal S1 and S2, no            murmur   Abdomen:   Soft and nontender.  Reducible left inguinal hernia.  No evidence of right inguinal hernia.   Extremities:   Moves all extremities well, no edema, no cyanosis, no             redness   Skin:   No bleeding, bruising or rash   Neurologic:   Normal without gross deficits.   Psychiatric: No evidence of depression or anxiety          Results Review:   None    Review of Systems was reviewed and confirmed as accurate as documented by the MA.    ASSESSMENT/PLAN:    1. Non-recurrent unilateral inguinal hernia without obstruction or gangrene        I explained the diagnosis to him.  He understands the diagnosis.  He understands the risks of having a hernia including increasing size, worsening pain, intestinal incarceration/strangulation.  I recommend a hernia repair with mesh implantation.  He understands the procedure as well as the risk of bleeding, infection, recurrence, pain including long-term pain, mesh issues including pain and infection etc.  He understands all the above and at this time wishes to proceed.          Electronically signed by Michael Vivar MD  08/08/24

## 2024-08-08 ENCOUNTER — OFFICE VISIT (OUTPATIENT)
Dept: SURGERY | Facility: CLINIC | Age: 54
End: 2024-08-08
Payer: MEDICARE

## 2024-08-08 VITALS
HEIGHT: 68 IN | SYSTOLIC BLOOD PRESSURE: 108 MMHG | TEMPERATURE: 99.6 F | HEART RATE: 90 BPM | DIASTOLIC BLOOD PRESSURE: 62 MMHG | OXYGEN SATURATION: 99 % | BODY MASS INDEX: 20.19 KG/M2 | WEIGHT: 133.2 LBS

## 2024-08-08 DIAGNOSIS — K40.90 NON-RECURRENT UNILATERAL INGUINAL HERNIA WITHOUT OBSTRUCTION OR GANGRENE: Primary | ICD-10-CM

## 2024-08-08 PROCEDURE — 99214 OFFICE O/P EST MOD 30 MIN: CPT | Performed by: SURGERY

## 2024-08-08 PROCEDURE — 1160F RVW MEDS BY RX/DR IN RCRD: CPT | Performed by: SURGERY

## 2024-08-08 PROCEDURE — 1159F MED LIST DOCD IN RCRD: CPT | Performed by: SURGERY

## 2024-08-21 ENCOUNTER — OUTSIDE FACILITY SERVICE (OUTPATIENT)
Dept: SURGERY | Facility: CLINIC | Age: 54
End: 2024-08-21
Payer: MEDICARE

## 2024-08-21 ENCOUNTER — DOCUMENTATION (OUTPATIENT)
Dept: SURGERY | Facility: CLINIC | Age: 54
End: 2024-08-21

## 2024-08-21 DIAGNOSIS — G89.18 POST-OP PAIN: Primary | ICD-10-CM

## 2024-08-21 RX ORDER — ONDANSETRON 4 MG/1
4 TABLET, FILM COATED ORAL EVERY 8 HOURS PRN
Qty: 8 TABLET | Refills: 0 | Status: SHIPPED | OUTPATIENT
Start: 2024-08-21 | End: 2024-08-26

## 2024-08-21 RX ORDER — HYDROCODONE BITARTRATE AND ACETAMINOPHEN 5; 325 MG/1; MG/1
1-2 TABLET ORAL EVERY 4 HOURS PRN
Qty: 10 TABLET | Refills: 0 | Status: SHIPPED | OUTPATIENT
Start: 2024-08-21 | End: 2024-08-23

## 2024-08-21 RX ORDER — IBUPROFEN 800 MG/1
800 TABLET ORAL EVERY 6 HOURS PRN
Qty: 16 TABLET | Refills: 0 | Status: SHIPPED | OUTPATIENT
Start: 2024-08-21 | End: 2024-08-26 | Stop reason: SDUPTHER

## 2024-08-21 NOTE — PROGRESS NOTES
Procedure   Procedures   PATIENT:    Kenan Miranda    DATE OF SURGERY:       PHYSICIAN:    Michael Vivar MD    REFERRING PHYSICIAN:  No ref. provider found    YOB: 1970    PREOPERATIVE DIAGNOSIS:  left inguinal hernia    POSTOPERATIVE DIAGNOSIS: left inguinal hernia    PROCEDURE:  left inguinal herniorraphy via Monica repair with mesh implantation    Location: [facility]    ANESTHESIA:  General endotracheal.    EBL: Less than 30 mL    Specimen: None    Complications: None      OPERATIVE PROCEDURE:  The patient was taken to the operating room, placed in the supine position, and given general endotracheal anesthesia per the Anesthesia service.  The patient was prepped and draped in the normal sterile fashion and the patient was given preoperative IV antibiotics.  An appropriate timeout per the nursing staff was performed prior to the incision.  I did meet with the patient preoperatively and marked them accordingly.    A transverse incision was made over the left inguinal canal.  This was sharply dissected down through the subcutaneous fat to the external oblique fibers, which were then divided in their direction.  Blunt dissection was used to dissect free the cord and it was fully mobilized.  Cord was skeletonised.  No evidence of indirect hernia was appreciated.  Nerve was identified and spared.  The direct hernia was identified and dissected free and reduced.    Phasix plug was inserted into the direct  defect. Phasix mesh was then  placed on the floor of the inguinal canal.  The onlay was anchored in place with interrupted Prolene U stitches to the shelving portion of the inguinal ligament and Vicryl to the conjoined tendon.  Laterally it was closed as well.  Plug was anchored to the overlying onlay with prolene to avoid migration.   Marcaine had already been injected locally for anesthetic.    0-Vicryl suture was used in a running fashion to close the external  oblique fibers.  The wound was then further closed in layers using interrupted simple Vicryl closure and a running subcuticular PDS closure for the skin.  There was scant blood loss throughout the case.  Hemostasis had been well controlled.  There were no intraoperative complications    The patient was stable at this point in time and subsequently transferred back to the recovery room in stable condition.    Michael Vivar MD    8/21/2024    11:04 EDT

## 2024-08-26 ENCOUNTER — TELEPHONE (OUTPATIENT)
Dept: SURGERY | Facility: CLINIC | Age: 54
End: 2024-08-26
Payer: MEDICARE

## 2024-08-26 DIAGNOSIS — G89.18 POST-OP PAIN: Primary | ICD-10-CM

## 2024-08-26 DIAGNOSIS — G89.18 POST-OP PAIN: ICD-10-CM

## 2024-08-26 RX ORDER — HYDROCODONE BITARTRATE AND ACETAMINOPHEN 5; 325 MG/1; MG/1
1-2 TABLET ORAL EVERY 4 HOURS PRN
Qty: 10 TABLET | Refills: 0 | Status: SHIPPED | OUTPATIENT
Start: 2024-08-26 | End: 2024-08-28

## 2024-08-26 RX ORDER — HYDROCODONE BITARTRATE AND ACETAMINOPHEN 5; 325 MG/1; MG/1
1-2 TABLET ORAL EVERY 4 HOURS PRN
Qty: 10 TABLET | Refills: 0 | Status: SHIPPED | OUTPATIENT
Start: 2024-08-26 | End: 2024-08-26

## 2024-08-26 RX ORDER — IBUPROFEN 800 MG/1
800 TABLET, FILM COATED ORAL EVERY 6 HOURS PRN
Qty: 16 TABLET | Refills: 0 | Status: SHIPPED | OUTPATIENT
Start: 2024-08-26 | End: 2024-08-31

## 2024-08-26 NOTE — PROGRESS NOTES
"Patient: Kenan Miranda  YOB: 1970    Date: 08/29/2024    Primary Care Provider: Glenny Menezes APRN    Chief Complaint   Patient presents with    Follow-up     Inguinal hernia repair        History: The patient is in the office today for follow up from recent inguinal hernia repair 8/21/24.  Patient states that he had some swelling and bruising to the scrotal region.  Feeling better today.    The following portions of the patient's history were reviewed and updated as appropriate: allergies, current medications, past family history, past medical history, past social history, past surgical history and problem list.    Review of Systems   Constitutional:  Negative for chills, fever and unexpected weight change.   HENT:  Negative for trouble swallowing and voice change.    Eyes:  Negative for visual disturbance.   Respiratory:  Negative for apnea, cough, chest tightness, shortness of breath and wheezing.    Cardiovascular:  Negative for chest pain, palpitations and leg swelling.   Gastrointestinal:  Negative for abdominal distention, abdominal pain, anal bleeding, blood in stool, constipation, diarrhea, nausea, rectal pain and vomiting.   Endocrine: Negative for cold intolerance and heat intolerance.   Genitourinary:  Negative for difficulty urinating, dysuria, flank pain, scrotal swelling and testicular pain.   Musculoskeletal:  Negative for back pain, gait problem and joint swelling.   Skin:  Negative for color change, rash and wound.   Neurological:  Negative for dizziness, syncope, speech difficulty, weakness, numbness and headaches.   Hematological:  Negative for adenopathy. Does not bruise/bleed easily.   Psychiatric/Behavioral:  Negative for confusion. The patient is not nervous/anxious.        Vital Signs  Vitals:    08/27/24 0954   BP: 128/70   Pulse: 69   Temp: 98.2 °F (36.8 °C)   SpO2: 98%   Weight: 60.3 kg (133 lb)   Height: 172.7 cm (67.99\")       Allergies:  Allergies   Allergen " Reactions    Penicillins Anaphylaxis     Beta lactam allergy details  Antibiotic reaction: other (anaphylaxis)  Age at reaction: child  Dose to reaction time: unknown  Reason for antibiotic: unknown  Epinephrine required for reaction?: unknown  Tolerated antibiotics: unknown           Medications:    Current Outpatient Medications:     HYDROcodone-acetaminophen (NORCO) 5-325 MG per tablet, Take 1-2 tablets by mouth Every 4 (Four) Hours As Needed for Severe Pain (Max 6/day) for up to 2 days., Disp: 10 tablet, Rfl: 0    ibuprofen (ADVIL,MOTRIN) 800 MG tablet, Take 1 tablet by mouth Every 6 (Six) Hours As Needed for Moderate Pain for up to 5 days., Disp: 16 tablet, Rfl: 0    ketoconazole (NIZORAL) 2 % cream, , Disp: , Rfl:     Vivitrol 380 MG reconstituted suspension IM suspension, INJECT 380MG INJECT (IM) EVERY FOUR (4) WEEKS, ALTERNATING SIDES AS DIRECTED, Disp: , Rfl:     Physical Exam:    Abdomen: Left hernia site healing nicely without evidence of infection.  He does have underlying hematoma and ecchymosis that appears to be resolving.     Results Review:   I reviewed the patient's new clinical results.     Review of Systems was reviewed and confirmed as accurate as documented by the MA.    ASSESSMENT/PLAN:    1. Postoperative visit         Patient overall doing well.  He does have a hematoma at the surgical site which I explained to him will resolve.  He is quite thin so any swelling will be more apparent also.  Reassured him.  Continue with avoiding heavy lifting.  I will follow him up in 2 weeks.    Electronically signed by Michael Vivar MD  08/27/24

## 2024-08-26 NOTE — TELEPHONE ENCOUNTER
Patient called and stated that he is still in a lot of pain and that he is black  from his surgery on 8/21/24 he is requesting some pain medication to be sent in he is also out of ibuprofen. Thanks

## 2024-08-27 ENCOUNTER — OFFICE VISIT (OUTPATIENT)
Dept: SURGERY | Facility: CLINIC | Age: 54
End: 2024-08-27
Payer: MEDICARE

## 2024-08-27 VITALS
SYSTOLIC BLOOD PRESSURE: 128 MMHG | HEIGHT: 68 IN | HEART RATE: 69 BPM | BODY MASS INDEX: 20.16 KG/M2 | WEIGHT: 133 LBS | OXYGEN SATURATION: 98 % | TEMPERATURE: 98.2 F | DIASTOLIC BLOOD PRESSURE: 70 MMHG

## 2024-08-27 DIAGNOSIS — Z48.89 POSTOPERATIVE VISIT: Primary | ICD-10-CM

## 2024-08-27 PROCEDURE — 1159F MED LIST DOCD IN RCRD: CPT | Performed by: SURGERY

## 2024-08-27 PROCEDURE — 1160F RVW MEDS BY RX/DR IN RCRD: CPT | Performed by: SURGERY

## 2024-08-27 PROCEDURE — 99024 POSTOP FOLLOW-UP VISIT: CPT | Performed by: SURGERY

## 2024-08-27 RX ORDER — NALTREXONE 380 MG
KIT INTRAMUSCULAR
COMMUNITY
Start: 2024-08-12

## 2024-08-27 RX ORDER — KETOCONAZOLE 20 MG/G
CREAM TOPICAL
COMMUNITY
Start: 2024-08-16

## 2024-09-06 NOTE — PROGRESS NOTES
Location: Posterior thigh    Procedure: Excision 3.5 cm subcutaneous lipoma      I recommend excision. Procedure and the risks and benefits were explained including bleeding and infection. The patient understands these and wishes to proceed.     The patient was brought to the procedure room. Consent and time out were performed. The area was prepped and draped in the usual fashion. 1% lidocaine with epinephrine was infused locally. An ellyptical incision was made around the dermal portion of the lesion.  There was a deeper subcutaneous portion of the lipoma as well and this was completely excised.   The lesion size was 3.5 PDS cm. The wound was closed in layers with interrupted simple vicryl and Nylon for the skin. There were no complications and the patient tolerated the procedure well. Hemostasis was well controlled with pressure and there was minimal blood loss. Wound instructions were given.

## 2024-09-10 ENCOUNTER — PROCEDURE VISIT (OUTPATIENT)
Dept: SURGERY | Facility: CLINIC | Age: 54
End: 2024-09-10
Payer: MEDICARE

## 2024-09-10 VITALS
HEIGHT: 68 IN | OXYGEN SATURATION: 99 % | TEMPERATURE: 99.3 F | HEART RATE: 82 BPM | BODY MASS INDEX: 20.16 KG/M2 | WEIGHT: 133 LBS

## 2024-09-10 DIAGNOSIS — D17.30 SUBCUTANEOUS LIPOMA: Primary | ICD-10-CM

## 2024-09-10 PROCEDURE — 27337 EXC THIGH/KNEE LES SC 3 CM/>: CPT | Performed by: SURGERY

## 2024-09-11 LAB — REF LAB TEST METHOD: NORMAL

## 2024-09-17 ENCOUNTER — OFFICE VISIT (OUTPATIENT)
Dept: SURGERY | Facility: CLINIC | Age: 54
End: 2024-09-17
Payer: MEDICARE

## 2024-09-17 VITALS
HEIGHT: 68 IN | WEIGHT: 133 LBS | HEART RATE: 66 BPM | OXYGEN SATURATION: 98 % | TEMPERATURE: 98.4 F | BODY MASS INDEX: 20.16 KG/M2

## 2024-09-17 DIAGNOSIS — Z48.89 POSTOPERATIVE VISIT: Primary | ICD-10-CM

## 2024-09-17 PROCEDURE — 1159F MED LIST DOCD IN RCRD: CPT | Performed by: SURGERY

## 2024-09-17 PROCEDURE — 1160F RVW MEDS BY RX/DR IN RCRD: CPT | Performed by: SURGERY

## 2024-09-17 PROCEDURE — 99024 POSTOP FOLLOW-UP VISIT: CPT | Performed by: SURGERY

## 2025-04-04 NOTE — PROGRESS NOTES
Patient: Kenan Miranda  YOB: 1970    Date: 04/08/2025    Primary Care Provider: Glenny Menezes APRN    Chief Complaint   Patient presents with    Follow-up     Hernia        History: Patient continues to have episodic sharp pain in the left groin that last for a few seconds.  Can happen 2 or 3 times a day.  Continues to have pressure sensation in that area as well.  The pain is fairly localized to the groin area.  This has occurred since surgery.      The following portions of the patient's history were reviewed and updated as appropriate: allergies, current medications, past family history, past medical history, past social history, past surgical history and problem list.    Review of Systems   Constitutional:  Negative for chills, fever and unexpected weight change.   HENT:  Negative for trouble swallowing and voice change.    Eyes:  Negative for visual disturbance.   Respiratory:  Negative for apnea, cough, chest tightness, shortness of breath and wheezing.    Cardiovascular:  Negative for chest pain, palpitations and leg swelling.   Gastrointestinal:  Negative for abdominal distention, abdominal pain, anal bleeding, blood in stool, constipation, diarrhea, nausea, rectal pain and vomiting.   Endocrine: Negative for cold intolerance and heat intolerance.   Genitourinary:  Positive for testicular pain. Negative for difficulty urinating, dysuria, flank pain and scrotal swelling.   Musculoskeletal:  Negative for back pain, gait problem and joint swelling.   Skin:  Negative for color change, rash and wound.   Neurological:  Negative for dizziness, syncope, speech difficulty, weakness, numbness and headaches.   Hematological:  Negative for adenopathy. Does not bruise/bleed easily.   Psychiatric/Behavioral:  Negative for confusion. The patient is not nervous/anxious.        Vital Signs  Vitals:    04/08/25 0926   BP: 124/86   Pulse: 108   Temp: 98.7 °F (37.1 °C)   TempSrc: Infrared   SpO2: 98%  "  Weight: 66.7 kg (147 lb)   Height: 172.7 cm (67.99\")       Allergies:  Allergies   Allergen Reactions    Penicillins Anaphylaxis     Beta lactam allergy details  Antibiotic reaction: other (anaphylaxis)  Age at reaction: child  Dose to reaction time: unknown  Reason for antibiotic: unknown  Epinephrine required for reaction?: unknown  Tolerated antibiotics: unknown           Medications:    Current Outpatient Medications:     ketoconazole (NIZORAL) 2 % cream, , Disp: , Rfl:     Vivitrol 380 MG reconstituted suspension IM suspension, INJECT 380MG INJECT (IM) EVERY FOUR (4) WEEKS, ALTERNATING SIDES AS DIRECTED, Disp: , Rfl:     Physical Exam:    Abdomen: Soft and nondistended.  He does have some tenderness in the inguinal canal.  No evidence of recurrent hernia.     Results Review:   None     Review of Systems was reviewed and confirmed as accurate as documented by the MA.    ASSESSMENT/PLAN:    1. Left groin pain       Patient with left inguinal pain fairly localized around the inguinal canal.  The main complaint is that sharp pain that he has for short duration 2 or 3 times a day.  There is no evidence of recurrent hernia.  Options include possible pain management with injection speak to pain management about this and get back to the patient.  Also the phasix mesh likely has not yet resorbed and that may improve symptoms as well.  Surgeons performed in August 2024.  Tylenol for pain.    Electronically signed by Michael Vivar MD  04/08/25      "

## 2025-04-08 ENCOUNTER — OFFICE VISIT (OUTPATIENT)
Dept: SURGERY | Facility: CLINIC | Age: 55
End: 2025-04-08
Payer: MEDICARE

## 2025-04-08 VITALS
HEART RATE: 108 BPM | BODY MASS INDEX: 22.28 KG/M2 | WEIGHT: 147 LBS | OXYGEN SATURATION: 98 % | HEIGHT: 68 IN | SYSTOLIC BLOOD PRESSURE: 124 MMHG | TEMPERATURE: 98.7 F | DIASTOLIC BLOOD PRESSURE: 86 MMHG

## 2025-04-08 DIAGNOSIS — R10.32 LEFT GROIN PAIN: Primary | ICD-10-CM

## 2025-04-08 PROCEDURE — 99213 OFFICE O/P EST LOW 20 MIN: CPT | Performed by: SURGERY

## 2025-04-08 PROCEDURE — 1160F RVW MEDS BY RX/DR IN RCRD: CPT | Performed by: SURGERY

## 2025-04-08 PROCEDURE — 1159F MED LIST DOCD IN RCRD: CPT | Performed by: SURGERY

## 2025-07-17 ENCOUNTER — TELEPHONE (OUTPATIENT)
Dept: CARDIOLOGY | Facility: CLINIC | Age: 55
End: 2025-07-17
Payer: MEDICARE

## 2025-07-17 RX ORDER — MULTIPLE VITAMINS W/ MINERALS TAB 9MG-400MCG
1 TAB ORAL DAILY
COMMUNITY

## 2025-07-17 RX ORDER — HYDROXYZINE PAMOATE 50 MG/1
50 CAPSULE ORAL 3 TIMES DAILY PRN
COMMUNITY
Start: 2025-07-09

## 2025-07-17 NOTE — PROGRESS NOTES
[facility]  Cardiology Consultation Note     Kenan Miranda  1970  Requesting Provider: JOE Chanel  PCP: Glenny Menezes APRN    ID:  Kenan Miranda is a 54 y.o. male who resides in [place], [place].     REASON FOR CONSULTATION:    Chest pain         Dear Glenny:    Thank you for referring Kenan Miranda to my office today for evaluation of chest pain.  He is a 54-year-old gentleman with no cardiac history.  He has been experiencing left-sided chest discomfort over the last 6 months.  He thought initially it was a rib fracture but symptoms have gotten worse.  He notices it most when he is walking.  He will have to stop and rest and the pain subsides.  Pain is not positional with truncal movements.  Not tender to palpation.    Risk factors include smoking.  Father had heart disease in his 60s.      Past Medical History, Past Surgical History, Family history, Social History, and Medications were all reviewed with the patient today and updated as necessary.       Current Outpatient Medications:     hydrOXYzine pamoate (VISTARIL) 50 MG capsule, Take 1 capsule by mouth 3 (Three) Times a Day As Needed., Disp: , Rfl:     ketoconazole (NIZORAL) 2 % cream, Apply 1 Application topically to the appropriate area as directed Daily., Disp: , Rfl:     multivitamin with minerals tablet tablet, Take 1 tablet by mouth Daily., Disp: , Rfl:     aspirin 81 MG EC tablet, Take 1 tablet by mouth Daily., Disp: 90 tablet, Rfl: 3    metoprolol tartrate (LOPRESSOR) 100 MG tablet, Take 100 mg at Bedtime the Night Before Coronary CTA Appointment and In the Morning 1 Hour Prior to Coronary CTA Appointment. Do not take if heart rate less than 60., Disp: 2 tablet, Rfl: 0    nitroglycerin (NITROSTAT) 0.4 MG SL tablet, Place 1 tablet under the tongue Every 5 (Five) Minutes As Needed for Chest Pain., Disp: 25 tablet, Rfl: 5    Allergies   Allergen Reactions    Penicillins Anaphylaxis      Beta lactam allergy details  Antibiotic reaction: other (anaphylaxis)  Age at reaction: child  Dose to reaction time: unknown  Reason for antibiotic: unknown  Epinephrine required for reaction?: unknown  Tolerated antibiotics: unknown             Past Medical History:   Diagnosis Date    Asthma     Patient reported childhood asthma    Colon polyp     Not sure last was with u around 2020.    Diabetes mellitus     History of chest pain     Patient reported many years ago and that he was assessed in ER and released and told related to GERD    Injury of back     Insomnia     Pleurisy 01/2023    Rheumatoid arthritis     Tattoos     Wears glasses     Wears partial dentures     Partial in upper mouth - advised no adhesives DOS       Past Surgical History:   Procedure Laterality Date    CHOLECYSTECTOMY WITH INTRAOPERATIVE CHOLANGIOGRAM N/A 3/22/2023    Procedure: CHOLECYSTECTOMY LAPAROSCOPIC INTRAOPERATIVE CHOLANGIOGRAPHY;  Surgeon: Michael Vivar MD;  Location: [facility] OR;  Service: General;  Laterality: N/A;    COLONOSCOPY      COLONOSCOPY N/A 8/12/2020    Procedure: COLONOSCOPY;  Surgeon: Michael Vivar MD;  Location: [facility] ENDOSCOPY;  Service: Gastroenterology;  Laterality: N/A;       Family History   Problem Relation Age of Onset    Cancer Other     Kidney disease Mother     Heart attack Mother     Heart disease Father        Social History     Tobacco Use    Smoking status: Some Days     Current packs/day: 0.25     Average packs/day: 0.3 packs/day for 12.0 years (3.0 ttl pk-yrs)     Types: Cigarettes     Passive exposure: Current    Smokeless tobacco: Former     Types: Snuff    Tobacco comments:     Uses snuff packets - not currently    Substance Use Topics    Alcohol use: Not Currently       Review of Systems   Cardiovascular:  Positive for chest pain.   Respiratory:  Positive for shortness of breath.                /80 (BP Location: Right arm, Patient Position: Sitting, Cuff Size: Adult)   Pulse 76    "Ht 172.7 cm (68\")   Wt 64.9 kg (143 lb)   SpO2 98%   BMI 21.74 kg/m²        Constitutional:       Appearance: Healthy appearance.   Eyes:      General: No scleral icterus.  Neck:      Thyroid: No thyroid mass.      Vascular: No carotid bruit or JVD. JVD normal.   Pulmonary:      Effort: Pulmonary effort is normal.      Breath sounds: Normal breath sounds.   Cardiovascular:      Normal rate. Regular rhythm.      Murmurs: There is no murmur.      No gallop.    Edema:     Peripheral edema absent.   Skin:     General: Skin is warm. There is no cyanosis.   Neurological:      General: No focal deficit present.      Mental Status: Alert.   Psychiatric:         Attention and Perception: Attention normal.             ECG 12 Lead    Date/Time: 7/18/2025 12:21 PM  Performed by: Ephraim Hodge IV, MD    Authorized by: Ephraim Hodge IV, MD  Comparison: not compared with previous ECG   Previous ECG: no previous ECG available  Rhythm: sinus rhythm  BPM: 76    Clinical impression: normal ECG          No results found for: \"CHOL\", \"HDL\", \"LDL\", \"LDLC\", \"VLDL\", \"TRIGL\"    Lab Results   Component Value Date    GLUCOSE 124 (H) 03/11/2023    BUN 14 03/11/2023    CREATININE 0.72 (L) 03/11/2023     (L) 03/11/2023    K 4.1 03/11/2023    CL 99 03/11/2023    CALCIUM 9.1 03/11/2023    PROTEINTOT 7.2 03/11/2023    ALBUMIN 4.4 03/11/2023    ALT 16 03/11/2023    AST 14 03/11/2023    ALKPHOS 74 03/11/2023    BILITOT 0.2 03/11/2023    GLOB 2.8 03/11/2023    AGRATIO 1.6 03/11/2023    BCR 19.4 03/11/2023    ANIONGAP 12.1 03/11/2023    EGFR 110.6 03/11/2023     Lab Results   Component Value Date    WBC 10.75 03/11/2023    HGB 14.3 03/11/2023    HCT 42.4 03/11/2023    MCV 89.1 03/11/2023     03/11/2023     No results found for: \"HGBA1C\"         Diagnoses and all orders for this visit:    1. Angina pectoris (Primary)  Overview:  Exertional chest discomfort  Normal EKG, 7/18/2025    Assessment & Plan:  Chest pain, " possibly cardiac  Recommend CT coronary angi[facility] at [facility]  Metoprolol tartrate 100 mg to be taken night before and morning of scan  Start aspirin 81 mg daily  Sublingual nitroglycerin prescribed    Orders:  -     CT Angiogram Coronary; Future  -     CT Angiogram Coronary-Cardiology Interpretation; Future  -     metoprolol tartrate (LOPRESSOR) tablet 200 mg  -     metoprolol tartrate (LOPRESSOR) tablet 150 mg  -     metoprolol tartrate (LOPRESSOR) tablet 100 mg  -     metoprolol tartrate (LOPRESSOR) tablet 50 mg  -     metoprolol tartrate (LOPRESSOR) tablet 50 mg  -     metoprolol tartrate (LOPRESSOR) injection 5 mg  -     nitroglycerin (NITROSTAT) SL tablet 0.4 mg  -     nitroglycerin (NITROSTAT) SL tablet 0.8 mg  -     ivabradine HCl (CORLANOR) tablet 15 mg  -     No Caffeine or Nicotine 4 Hours Prior to CTA Appointment  -     Nothing to Eat or Drink 4 Hours Prior to CTA Appointment  -     Do Not Take Phosphodiasterase Inhibitors in the 72 Hours Prior to Coronary CTA  -     Obtain Informed Consent - Computed Tomography Angiography of Chest - Angiogram of Coronary Arteries; Standing  -     Vital Signs Upon Arrival; Standing  -     Cardiac Monitoring; Standing  -     Verify NPO Status - Patient to Be NPO at Least 4 Hours Prior to CTA; Standing  -     Notify CT After Administration of metoprolol tartrate (LOPRESSOR) tablet; Standing  -     Notify Provider If Total Metoprolol Given Equals 300mg & Heart Rate Not At Goal; Standing  -     Notify Provider Prior to Administration of Nitroglycerin if Patient SBP <80; Standing  -     POC Creatinine; Standing  -     Insert Peripheral IV; Standing  -     Saline Lock & Maintain IV Access; Standing  -     sodium chloride 0.9 % flush 10 mL  -     sodium chloride 0.9 % flush 10 mL  -     sodium chloride 0.9 % infusion 40 mL  -     Vital Signs - See Instructions; Standing  -     Hold Medication Metformin (Glucophage, Glucophage XR, Fortament, Glumetza);  Metglip  (metformin/glipizide);  Glucovance (metformin/glyburide); Avandamet (metformin/rosiglitazone); Standing  -     Patient May Discharge Home After Procedure Complete (If Stable); Standing  -     metoprolol tartrate (LOPRESSOR) 100 MG tablet; Take 100 mg at Bedtime the Night Before Coronary CTA Appointment and In the Morning 1 Hour Prior to Coronary CTA Appointment. Do not take if heart rate less than 60.  Dispense: 2 tablet; Refill: 0  -     Comprehensive Metabolic Panel; Future  -     LDL Cholesterol, Direct; Future  -     Lipoprotein A (LPA); Future  -     CBC (No Diff); Future  -     Adult Transthoracic Echo Complete W/ Cont if Necessary Per Protocol; Future  -     nitroglycerin (NITROSTAT) 0.4 MG SL tablet; Place 1 tablet under the tongue Every 5 (Five) Minutes As Needed for Chest Pain.  Dispense: 25 tablet; Refill: 5  -     aspirin 81 MG EC tablet; Take 1 tablet by mouth Daily.  Dispense: 90 tablet; Refill: 3    2. Current smoker  Assessment & Plan:  Smoking cessation recommended    Orders:  -     Adult Transthoracic Echo Complete W/ Cont if Necessary Per Protocol; Future    3. Hyperlipidemia LDL goal <70  Assessment & Plan:  Obtain lipids, LP(a), CMP  Obtain coronary artery disease detected on CT angiogram, will initiate statin therapy    Orders:  -     Comprehensive Metabolic Panel; Future  -     LDL Cholesterol, Direct; Future  -     Lipoprotein A (LPA); Future  -     Adult Transthoracic Echo Complete W/ Cont if Necessary Per Protocol; Future    4. Chest pain, unspecified type  -     Adult Transthoracic Echo Complete W/ Cont if Necessary Per Protocol; Future    Other orders  -     ECG 12 Lead                 CT coronary angiogram.  Metoprolol to tartrate to be taken the night before morning of scan  Echo  Lab work today including CMP, direct LDL, LP(a)  Smoking cessation recommended  Further recommendations to follow after above testing        SHIV Hodge MD, FACC, [facility]  Interventional  Cardiology  07/18/25  12:23 EDT

## 2025-07-18 ENCOUNTER — OFFICE VISIT (OUTPATIENT)
Dept: CARDIOLOGY | Facility: CLINIC | Age: 55
End: 2025-07-18
Payer: MEDICARE

## 2025-07-18 VITALS
DIASTOLIC BLOOD PRESSURE: 80 MMHG | SYSTOLIC BLOOD PRESSURE: 110 MMHG | WEIGHT: 143 LBS | HEIGHT: 68 IN | HEART RATE: 76 BPM | BODY MASS INDEX: 21.67 KG/M2 | OXYGEN SATURATION: 98 %

## 2025-07-18 DIAGNOSIS — I20.9 ANGINA PECTORIS: Primary | ICD-10-CM

## 2025-07-18 DIAGNOSIS — F17.200 CURRENT SMOKER: ICD-10-CM

## 2025-07-18 DIAGNOSIS — E78.5 HYPERLIPIDEMIA LDL GOAL <70: ICD-10-CM

## 2025-07-18 DIAGNOSIS — R07.9 CHEST PAIN, UNSPECIFIED TYPE: ICD-10-CM

## 2025-07-18 RX ORDER — IVABRADINE 5 MG/1
15 TABLET, FILM COATED ORAL ONCE
OUTPATIENT
Start: 2025-07-18 | End: 2025-07-18

## 2025-07-18 RX ORDER — METOPROLOL TARTRATE 1 MG/ML
5 INJECTION, SOLUTION INTRAVENOUS
OUTPATIENT
Start: 2025-07-18

## 2025-07-18 RX ORDER — NITROGLYCERIN 0.4 MG/1
0.4 TABLET SUBLINGUAL
OUTPATIENT
Start: 2025-07-18 | End: 2025-07-18

## 2025-07-18 RX ORDER — METOPROLOL TARTRATE 25 MG/1
200 TABLET, FILM COATED ORAL ONCE
OUTPATIENT
Start: 2025-07-18 | End: 2025-07-18

## 2025-07-18 RX ORDER — SODIUM CHLORIDE 0.9 % (FLUSH) 0.9 %
10 SYRINGE (ML) INJECTION EVERY 12 HOURS SCHEDULED
OUTPATIENT
Start: 2025-07-18

## 2025-07-18 RX ORDER — NITROGLYCERIN 0.4 MG/1
0.8 TABLET SUBLINGUAL
OUTPATIENT
Start: 2025-07-18

## 2025-07-18 RX ORDER — METOPROLOL TARTRATE 50 MG
50 TABLET ORAL
OUTPATIENT
Start: 2025-07-18

## 2025-07-18 RX ORDER — METOPROLOL TARTRATE 25 MG/1
150 TABLET, FILM COATED ORAL ONCE
OUTPATIENT
Start: 2025-07-18

## 2025-07-18 RX ORDER — METOPROLOL TARTRATE 25 MG/1
100 TABLET, FILM COATED ORAL ONCE
OUTPATIENT
Start: 2025-07-18

## 2025-07-18 RX ORDER — METOPROLOL TARTRATE 100 MG/1
TABLET ORAL
Qty: 2 TABLET | Refills: 0 | Status: SHIPPED | OUTPATIENT
Start: 2025-07-18

## 2025-07-18 RX ORDER — NITROGLYCERIN 0.4 MG/1
0.4 TABLET SUBLINGUAL
Qty: 25 TABLET | Refills: 5 | Status: SHIPPED | OUTPATIENT
Start: 2025-07-18

## 2025-07-18 RX ORDER — METOPROLOL TARTRATE 25 MG/1
50 TABLET, FILM COATED ORAL ONCE
OUTPATIENT
Start: 2025-07-18

## 2025-07-18 RX ORDER — SODIUM CHLORIDE 0.9 % (FLUSH) 0.9 %
10 SYRINGE (ML) INJECTION AS NEEDED
OUTPATIENT
Start: 2025-07-18

## 2025-07-18 RX ORDER — SODIUM CHLORIDE 9 MG/ML
40 INJECTION, SOLUTION INTRAVENOUS AS NEEDED
OUTPATIENT
Start: 2025-07-18

## 2025-07-18 RX ORDER — ASPIRIN 81 MG/1
81 TABLET ORAL DAILY
Qty: 90 TABLET | Refills: 3 | Status: SHIPPED | OUTPATIENT
Start: 2025-07-18

## 2025-07-18 NOTE — ASSESSMENT & PLAN NOTE
Obtain lipids, LP(a), CMP  Obtain coronary artery disease detected on CT angiogram, will initiate statin therapy

## 2025-07-18 NOTE — LETTER
July 18, 2025     RICARDO Chanel  [address] [place] [id number]    Patient: Kenan Miranda   YOB: 1970   Date of Visit: 7/18/2025     Dear RICARDO Chanel:       Thank you for referring Kenan Miranda to me for evaluation. Below are the relevant portions of my assessment and plan of care.    If you have questions, please do not hesitate to call me. I look forward to following Kenan along with you.         Sincerely,        Ephraim Hodge IV, MD        CC: No Recipients    Ephraim Hodge IV, MD  07/18/25 1224  Signed  [facility]  Cardiology Consultation Note     Kenan Miranda  1970  Requesting Provider: JOE Chanel  PCP: Glenny Menezes APRN    ID:  Kenan Miranda is a 54 y.o. male who resides in [place], [place].     REASON FOR CONSULTATION:    Chest pain         Dear Glenny:    Thank you for referring Kenan Miranda to my office today for evaluation of chest pain.  He is a 54-year-old gentleman with no cardiac history.  He has been experiencing left-sided chest discomfort over the last 6 months.  He thought initially it was a rib fracture but symptoms have gotten worse.  He notices it most when he is walking.  He will have to stop and rest and the pain subsides.  Pain is not positional with truncal movements.  Not tender to palpation.    Risk factors include smoking.  Father had heart disease in his 60s.      Past Medical History, Past Surgical History, Family history, Social History, and Medications were all reviewed with the patient today and updated as necessary.       Current Outpatient Medications:   •  hydrOXYzine pamoate (VISTARIL) 50 MG capsule, Take 1 capsule by mouth 3 (Three) Times a Day As Needed., Disp: , Rfl:   •  ketoconazole (NIZORAL) 2 % cream, Apply 1 Application topically to the appropriate area as directed Daily., Disp: , Rfl:   •  multivitamin with minerals tablet  tablet, Take 1 tablet by mouth Daily., Disp: , Rfl:   •  aspirin 81 MG EC tablet, Take 1 tablet by mouth Daily., Disp: 90 tablet, Rfl: 3  •  metoprolol tartrate (LOPRESSOR) 100 MG tablet, Take 100 mg at Bedtime the Night Before Coronary CTA Appointment and In the Morning 1 Hour Prior to Coronary CTA Appointment. Do not take if heart rate less than 60., Disp: 2 tablet, Rfl: 0  •  nitroglycerin (NITROSTAT) 0.4 MG SL tablet, Place 1 tablet under the tongue Every 5 (Five) Minutes As Needed for Chest Pain., Disp: 25 tablet, Rfl: 5    Allergies   Allergen Reactions   • Penicillins Anaphylaxis     Beta lactam allergy details  Antibiotic reaction: other (anaphylaxis)  Age at reaction: child  Dose to reaction time: unknown  Reason for antibiotic: unknown  Epinephrine required for reaction?: unknown  Tolerated antibiotics: unknown             Past Medical History:   Diagnosis Date   • Asthma     Patient reported childhood asthma   • Colon polyp     Not sure last was with u around 2020.   • Diabetes mellitus    • History of chest pain     Patient reported many years ago and that he was assessed in ER and released and told related to GERD   • Injury of back    • Insomnia    • Pleurisy 01/2023   • Rheumatoid arthritis    • Tattoos    • Wears glasses    • Wears partial dentures     Partial in upper mouth - advised no adhesives DOS       Past Surgical History:   Procedure Laterality Date   • CHOLECYSTECTOMY WITH INTRAOPERATIVE CHOLANGIOGRAM N/A 3/22/2023    Procedure: CHOLECYSTECTOMY LAPAROSCOPIC INTRAOPERATIVE CHOLANGIOGRAPHY;  Surgeon: Michael Vivar MD;  Location: [facility] OR;  Service: General;  Laterality: N/A;   • COLONOSCOPY     • COLONOSCOPY N/A 8/12/2020    Procedure: COLONOSCOPY;  Surgeon: Michael Vivar MD;  Location: [facility] ENDOSCOPY;  Service: Gastroenterology;  Laterality: N/A;       Family History   Problem Relation Age of Onset   • Cancer Other    • Kidney disease Mother    • Heart attack Mother    • Heart  "disease Father        Social History     Tobacco Use   • Smoking status: Some Days     Current packs/day: 0.25     Average packs/day: 0.3 packs/day for 12.0 years (3.0 ttl pk-yrs)     Types: Cigarettes     Passive exposure: Current   • Smokeless tobacco: Former     Types: Snuff   • Tobacco comments:     Uses snuff packets - not currently    Substance Use Topics   • Alcohol use: Not Currently       Review of Systems   Cardiovascular:  Positive for chest pain.   Respiratory:  Positive for shortness of breath.                /80 (BP Location: Right arm, Patient Position: Sitting, Cuff Size: Adult)   Pulse 76   Ht 172.7 cm (68\")   Wt 64.9 kg (143 lb)   SpO2 98%   BMI 21.74 kg/m²        Constitutional:       Appearance: Healthy appearance.   Eyes:      General: No scleral icterus.  Neck:      Thyroid: No thyroid mass.      Vascular: No carotid bruit or JVD. JVD normal.   Pulmonary:      Effort: Pulmonary effort is normal.      Breath sounds: Normal breath sounds.   Cardiovascular:      Normal rate. Regular rhythm.      Murmurs: There is no murmur.      No gallop.    Edema:     Peripheral edema absent.   Skin:     General: Skin is warm. There is no cyanosis.   Neurological:      General: No focal deficit present.      Mental Status: Alert.   Psychiatric:         Attention and Perception: Attention normal.             ECG 12 Lead    Date/Time: 7/18/2025 12:21 PM  Performed by: Ephraim Hodge IV, MD    Authorized by: Ephraim Hodge IV, MD  Comparison: not compared with previous ECG   Previous ECG: no previous ECG available  Rhythm: sinus rhythm  BPM: 76    Clinical impression: normal ECG          No results found for: \"CHOL\", \"HDL\", \"LDL\", \"LDLC\", \"VLDL\", \"TRIGL\"    Lab Results   Component Value Date    GLUCOSE 124 (H) 03/11/2023    BUN 14 03/11/2023    CREATININE 0.72 (L) 03/11/2023     (L) 03/11/2023    K 4.1 03/11/2023    CL 99 03/11/2023    CALCIUM 9.1 03/11/2023    PROTEINTOT 7.2 " "03/11/2023    ALBUMIN 4.4 03/11/2023    ALT 16 03/11/2023    AST 14 03/11/2023    ALKPHOS 74 03/11/2023    BILITOT 0.2 03/11/2023    GLOB 2.8 03/11/2023    AGRATIO 1.6 03/11/2023    BCR 19.4 03/11/2023    ANIONGAP 12.1 03/11/2023    EGFR 110.6 03/11/2023     Lab Results   Component Value Date    WBC 10.75 03/11/2023    HGB 14.3 03/11/2023    HCT 42.4 03/11/2023    MCV 89.1 03/11/2023     03/11/2023     No results found for: \"HGBA1C\"         Diagnoses and all orders for this visit:    1. Angina pectoris (Primary)  Overview:  Exertional chest discomfort  Normal EKG, 7/18/2025    Assessment & Plan:  Chest pain, possibly cardiac  Recommend CT coronary angiogram at [facility]  Metoprolol tartrate 100 mg to be taken night before and morning of scan  Start aspirin 81 mg daily  Sublingual nitroglycerin prescribed    Orders:  -     CT Angiogram Coronary; Future  -     CT Angiogram Coronary-Cardiology Interpretation; Future  -     metoprolol tartrate (LOPRESSOR) tablet 200 mg  -     metoprolol tartrate (LOPRESSOR) tablet 150 mg  -     metoprolol tartrate (LOPRESSOR) tablet 100 mg  -     metoprolol tartrate (LOPRESSOR) tablet 50 mg  -     metoprolol tartrate (LOPRESSOR) tablet 50 mg  -     metoprolol tartrate (LOPRESSOR) injection 5 mg  -     nitroglycerin (NITROSTAT) SL tablet 0.4 mg  -     nitroglycerin (NITROSTAT) SL tablet 0.8 mg  -     ivabradine HCl (CORLANOR) tablet 15 mg  -     No Caffeine or Nicotine 4 Hours Prior to CTA Appointment  -     Nothing to Eat or Drink 4 Hours Prior to CTA Appointment  -     Do Not Take Phosphodiasterase Inhibitors in the 72 Hours Prior to Coronary CTA  -     Obtain Informed Consent - Computed Tomography Angiography of Chest - Angiogram of Coronary Arteries; Standing  -     Vital Signs Upon Arrival; Standing  -     Cardiac Monitoring; Standing  -     Verify NPO Status - Patient to Be NPO at Least 4 Hours Prior to CTA; Standing  -     Notify CT After Administration of metoprolol " tartrate (LOPRESSOR) tablet; Standing  -     Notify Provider If Total Metoprolol Given Equals 300mg & Heart Rate Not At Goal; Standing  -     Notify Provider Prior to Administration of Nitroglycerin if Patient SBP <80; Standing  -     POC Creatinine; Standing  -     Insert Peripheral IV; Standing  -     Saline Lock & Maintain IV Access; Standing  -     sodium chloride 0.9 % flush 10 mL  -     sodium chloride 0.9 % flush 10 mL  -     sodium chloride 0.9 % infusion 40 mL  -     Vital Signs - See Instructions; Standing  -     Hold Medication Metformin (Glucophage, Glucophage XR, Fortament, Glumetza);  Metglip (metformin/glipizide);  Glucovance (metformin/glyburide); Avandamet (metformin/rosiglitazone); Standing  -     Patient May Discharge Home After Procedure Complete (If Stable); Standing  -     metoprolol tartrate (LOPRESSOR) 100 MG tablet; Take 100 mg at Bedtime the Night Before Coronary CTA Appointment and In the Morning 1 Hour Prior to Coronary CTA Appointment. Do not take if heart rate less than 60.  Dispense: 2 tablet; Refill: 0  -     Comprehensive Metabolic Panel; Future  -     LDL Cholesterol, Direct; Future  -     Lipoprotein A (LPA); Future  -     CBC (No Diff); Future  -     Adult Transthoracic Echo Complete W/ Cont if Necessary Per Protocol; Future  -     nitroglycerin (NITROSTAT) 0.4 MG SL tablet; Place 1 tablet under the tongue Every 5 (Five) Minutes As Needed for Chest Pain.  Dispense: 25 tablet; Refill: 5  -     aspirin 81 MG EC tablet; Take 1 tablet by mouth Daily.  Dispense: 90 tablet; Refill: 3    2. Current smoker  Assessment & Plan:  Smoking cessation recommended    Orders:  -     Adult Transthoracic Echo Complete W/ Cont if Necessary Per Protocol; Future    3. Hyperlipidemia LDL goal <70  Assessment & Plan:  Obtain lipids, LP(a), CMP  Obtain coronary artery disease detected on CT angiogram, will initiate statin therapy    Orders:  -     Comprehensive Metabolic Panel; Future  -     LDL  Cholesterol, Direct; Future  -     Lipoprotein A (LPA); Future  -     Adult Transthoracic Echo Complete W/ Cont if Necessary Per Protocol; Future    4. Chest pain, unspecified type  -     Adult Transthoracic Echo Complete W/ Cont if Necessary Per Protocol; Future    Other orders  -     ECG 12 Lead                 CT coronary angiogram.  Metoprolol to tartrate to be taken the night before morning of scan  Echo  Lab work today including CMP, direct LDL, LP(a)  Smoking cessation recommended  Further recommendations to follow after above testing        SHIV Hodge MD, [facility], [facility]  Interventional Cardiology  07/18/25  12:23 EDT

## 2025-07-18 NOTE — ASSESSMENT & PLAN NOTE
Chest pain, possibly cardiac  Recommend CT coronary angiogram at [facility]  Metoprolol tartrate 100 mg to be taken night before and morning of scan  Start aspirin 81 mg daily  Sublingual nitroglycerin prescribed

## 2025-07-28 ENCOUNTER — TELEPHONE (OUTPATIENT)
Dept: SURGERY | Facility: CLINIC | Age: 55
End: 2025-07-28
Payer: MEDICARE

## 2025-07-28 NOTE — TELEPHONE ENCOUNTER
LVM FOR SONAM TO CALL AND UPDATE INFORMATION. PATIENT ON A 5 YR COLONOSCOPY RECALL.MAILING OUT LETTER.

## 2025-07-30 ENCOUNTER — HOSPITAL ENCOUNTER (OUTPATIENT)
Dept: CARDIOLOGY | Facility: HOSPITAL | Age: 55
Discharge: HOME | End: 2025-07-30
Admitting: INTERNAL MEDICINE
Payer: MEDICARE

## 2025-07-30 VITALS
DIASTOLIC BLOOD PRESSURE: 66 MMHG | SYSTOLIC BLOOD PRESSURE: 99 MMHG | BODY MASS INDEX: 21.67 KG/M2 | HEIGHT: 68 IN | WEIGHT: 143 LBS

## 2025-07-30 DIAGNOSIS — I20.9 ANGINA PECTORIS: ICD-10-CM

## 2025-07-30 DIAGNOSIS — F17.200 CURRENT SMOKER: ICD-10-CM

## 2025-07-30 DIAGNOSIS — E78.5 HYPERLIPIDEMIA LDL GOAL <70: ICD-10-CM

## 2025-07-30 DIAGNOSIS — R07.9 CHEST PAIN, UNSPECIFIED TYPE: ICD-10-CM

## 2025-07-30 LAB
AORTIC DIMENSIONLESS INDEX: 0.84 (DI)
AV MEAN PRESS GRAD SYS DOP V1V2: 3 MMHG
AV VMAX SYS DOP: 126.5 CM/SEC
BH CV ECHO MEAS - AO MAX PG: 6.4 MMHG
BH CV ECHO MEAS - AO ROOT DIAM: 3.1 CM
BH CV ECHO MEAS - AO V2 VTI: 26.1 CM
BH CV ECHO MEAS - AVA(I,D): 2.8 CM2
BH CV ECHO MEAS - EDV(CUBED): 74.1 ML
BH CV ECHO MEAS - EDV(MOD-SP2): 85.8 ML
BH CV ECHO MEAS - EDV(MOD-SP4): 84.5 ML
BH CV ECHO MEAS - EF(MOD-SP2): 61.5 %
BH CV ECHO MEAS - EF(MOD-SP4): 65.7 %
BH CV ECHO MEAS - ESV(CUBED): 24.7 ML
BH CV ECHO MEAS - ESV(MOD-SP2): 33 ML
BH CV ECHO MEAS - ESV(MOD-SP4): 29 ML
BH CV ECHO MEAS - FS: 30.6 %
BH CV ECHO MEAS - IVS/LVPW: 1 CM
BH CV ECHO MEAS - IVSD: 0.8 CM
BH CV ECHO MEAS - LA DIMENSION: 2.7 CM
BH CV ECHO MEAS - LAT PEAK E' VEL: 14.8 CM/SEC
BH CV ECHO MEAS - LV DIASTOLIC VOL/BSA (35-75): 47.7 CM2
BH CV ECHO MEAS - LV MASS(C)D: 101.3 GRAMS
BH CV ECHO MEAS - LV MAX PG: 5.6 MMHG
BH CV ECHO MEAS - LV MEAN PG: 3 MMHG
BH CV ECHO MEAS - LV SYSTOLIC VOL/BSA (12-30): 16.4 CM2
BH CV ECHO MEAS - LV V1 MAX: 118.5 CM/SEC
BH CV ECHO MEAS - LV V1 VTI: 22 CM
BH CV ECHO MEAS - LVIDD: 4.2 CM
BH CV ECHO MEAS - LVIDS: 2.9 CM
BH CV ECHO MEAS - LVOT AREA: 3.3 CM2
BH CV ECHO MEAS - LVOT DIAM: 2.05 CM
BH CV ECHO MEAS - LVPWD: 0.8 CM
BH CV ECHO MEAS - MED PEAK E' VEL: 10.2 CM/SEC
BH CV ECHO MEAS - MV A MAX VEL: 48 CM/SEC
BH CV ECHO MEAS - MV DEC SLOPE: 338.2 CM/SEC2
BH CV ECHO MEAS - MV DEC TIME: 0.18 SEC
BH CV ECHO MEAS - MV E MAX VEL: 58.7 CM/SEC
BH CV ECHO MEAS - MV E/A: 1.22
BH CV ECHO MEAS - MV MAX PG: 2.15 MMHG
BH CV ECHO MEAS - MV MEAN PG: 1.09 MMHG
BH CV ECHO MEAS - MV P1/2T: 59.4 MSEC
BH CV ECHO MEAS - MV V2 VTI: 23.1 CM
BH CV ECHO MEAS - MVA(P1/2T): 3.7 CM2
BH CV ECHO MEAS - MVA(VTI): 3.2 CM2
BH CV ECHO MEAS - PA ACC TIME: 0.14 SEC
BH CV ECHO MEAS - PA V2 MAX: 94.4 CM/SEC
BH CV ECHO MEAS - RAP SYSTOLE: 3 MMHG
BH CV ECHO MEAS - RVSP: 26 MMHG
BH CV ECHO MEAS - SV(LVOT): 72.7 ML
BH CV ECHO MEAS - SV(MOD-SP2): 52.8 ML
BH CV ECHO MEAS - SV(MOD-SP4): 55.5 ML
BH CV ECHO MEAS - SVI(LVOT): 41 ML/M2
BH CV ECHO MEAS - SVI(MOD-SP2): 29.8 ML/M2
BH CV ECHO MEAS - SVI(MOD-SP4): 31.3 ML/M2
BH CV ECHO MEAS - TAPSE (>1.6): 2.7 CM
BH CV ECHO MEAS - TR MAX PG: 21.9 MMHG
BH CV ECHO MEAS - TR MAX VEL: 233.6 CM/SEC
BH CV ECHO MEASUREMENTS AVERAGE E/E' RATIO: 4.7
BH CV VAS BP LEFT ARM: NORMAL MMHG
BH CV XLRA - RV BASE: 3.6 CM
BH CV XLRA - RV LENGTH: 7.6 CM
BH CV XLRA - RV MID: 2.9 CM
BH CV XLRA - TDI S': 14.3 CM/SEC
IVRT: 113 MS
LEFT ATRIUM VOLUME INDEX: 17.4 ML/M2
LV EF BIPLANE MOD: 64.2 %

## 2025-07-30 PROCEDURE — 93306 TTE W/DOPPLER COMPLETE: CPT

## 2025-08-15 ENCOUNTER — TELEPHONE (OUTPATIENT)
Facility: HOSPITAL | Age: 55
End: 2025-08-15
Payer: MEDICARE

## 2025-08-15 DIAGNOSIS — I20.9 ANGINA PECTORIS: ICD-10-CM

## 2025-08-15 RX ORDER — METOPROLOL TARTRATE 100 MG/1
TABLET ORAL
Qty: 2 TABLET | Refills: 0 | Status: SHIPPED | OUTPATIENT
Start: 2025-08-15

## 2025-08-18 ENCOUNTER — HOSPITAL ENCOUNTER (OUTPATIENT)
Facility: HOSPITAL | Age: 55
Discharge: HOME | End: 2025-08-18
Payer: MEDICARE

## 2025-08-18 VITALS
HEIGHT: 67 IN | DIASTOLIC BLOOD PRESSURE: 73 MMHG | TEMPERATURE: 98 F | RESPIRATION RATE: 18 BRPM | BODY MASS INDEX: 22.15 KG/M2 | HEART RATE: 57 BPM | WEIGHT: 141.09 LBS | SYSTOLIC BLOOD PRESSURE: 95 MMHG

## 2025-08-18 DIAGNOSIS — I20.9 ANGINA PECTORIS: ICD-10-CM

## 2025-08-18 PROCEDURE — 63710000001 NITROGLYCERIN 0.4 MG SUBLINGUAL TABLET: Performed by: INTERNAL MEDICINE

## 2025-08-18 PROCEDURE — A9270 NON-COVERED ITEM OR SERVICE: HCPCS | Performed by: INTERNAL MEDICINE

## 2025-08-18 PROCEDURE — 75574 CT ANGIO HRT W/3D IMAGE: CPT

## 2025-08-18 PROCEDURE — 75574 CT ANGIO HRT W/3D IMAGE: CPT | Performed by: INTERNAL MEDICINE

## 2025-08-18 PROCEDURE — 25510000001 IOPAMIDOL PER 1 ML: Performed by: INTERNAL MEDICINE

## 2025-08-18 RX ORDER — SODIUM CHLORIDE 0.9 % (FLUSH) 0.9 %
10 SYRINGE (ML) INJECTION AS NEEDED
Status: DISCONTINUED | OUTPATIENT
Start: 2025-08-18 | End: 2025-08-19 | Stop reason: HOSPADM

## 2025-08-18 RX ORDER — NITROGLYCERIN 0.4 MG/1
0.8 TABLET SUBLINGUAL
Status: COMPLETED | OUTPATIENT
Start: 2025-08-18 | End: 2025-08-18

## 2025-08-18 RX ORDER — METOPROLOL TARTRATE 25 MG/1
50 TABLET, FILM COATED ORAL
Status: DISCONTINUED | OUTPATIENT
Start: 2025-08-18 | End: 2025-08-19 | Stop reason: HOSPADM

## 2025-08-18 RX ORDER — IOPAMIDOL 755 MG/ML
100 INJECTION, SOLUTION INTRAVASCULAR
Status: COMPLETED | OUTPATIENT
Start: 2025-08-18 | End: 2025-08-18

## 2025-08-18 RX ORDER — METOPROLOL TARTRATE 1 MG/ML
5 INJECTION, SOLUTION INTRAVENOUS
Status: DISCONTINUED | OUTPATIENT
Start: 2025-08-18 | End: 2025-08-19 | Stop reason: HOSPADM

## 2025-08-18 RX ORDER — IVABRADINE 5 MG/1
15 TABLET, FILM COATED ORAL ONCE
Status: DISCONTINUED | OUTPATIENT
Start: 2025-08-18 | End: 2025-08-19 | Stop reason: HOSPADM

## 2025-08-18 RX ORDER — SODIUM CHLORIDE 0.9 % (FLUSH) 0.9 %
10 SYRINGE (ML) INJECTION EVERY 12 HOURS SCHEDULED
Status: DISCONTINUED | OUTPATIENT
Start: 2025-08-18 | End: 2025-08-19 | Stop reason: HOSPADM

## 2025-08-18 RX ORDER — METOPROLOL TARTRATE 100 MG/1
100 TABLET ORAL ONCE
Status: DISCONTINUED | OUTPATIENT
Start: 2025-08-18 | End: 2025-08-19 | Stop reason: HOSPADM

## 2025-08-18 RX ORDER — METOPROLOL TARTRATE 25 MG/1
50 TABLET, FILM COATED ORAL ONCE
Status: DISCONTINUED | OUTPATIENT
Start: 2025-08-18 | End: 2025-08-19 | Stop reason: HOSPADM

## 2025-08-18 RX ORDER — SODIUM CHLORIDE 9 MG/ML
40 INJECTION, SOLUTION INTRAVENOUS AS NEEDED
Status: DISCONTINUED | OUTPATIENT
Start: 2025-08-18 | End: 2025-08-19 | Stop reason: HOSPADM

## 2025-08-18 RX ORDER — NITROGLYCERIN 0.4 MG/1
0.4 TABLET SUBLINGUAL
Status: COMPLETED | OUTPATIENT
Start: 2025-08-18 | End: 2025-08-18

## 2025-08-18 RX ORDER — METOPROLOL TARTRATE 100 MG/1
200 TABLET ORAL ONCE
Status: DISCONTINUED | OUTPATIENT
Start: 2025-08-18 | End: 2025-08-19 | Stop reason: HOSPADM

## 2025-08-18 RX ADMIN — NITROGLYCERIN 0.4 MG: 0.4 TABLET SUBLINGUAL at 12:19

## 2025-08-18 RX ADMIN — IOPAMIDOL 65 ML: 755 INJECTION, SOLUTION INTRAVENOUS at 12:20

## (undated) DEVICE — ENDOSCOPY PORT CONNECTOR FOR OLYMPUS® SCOPES: Brand: ERBE

## (undated) DEVICE — HYBRID TUBING/CAP SET FOR OLYMPUS® SCOPES: Brand: ERBE

## (undated) DEVICE — ENDOPATH XCEL BLADELESS TROCARS WITH STABILITY SLEEVES: Brand: ENDOPATH XCEL

## (undated) DEVICE — Device

## (undated) DEVICE — VLV SXN AIR/H2O ORCAPOD3 1P/U STRL

## (undated) DEVICE — GLV SURG SENSICARE W/ALOE PF LF 7.5 STRL

## (undated) DEVICE — ENDOPATH XCEL UNIVERSAL TROCAR STABLILITY SLEEVES: Brand: ENDOPATH XCEL

## (undated) DEVICE — SLV SCD CALF HEMOFORCE DVT THERP REPROC MD

## (undated) DEVICE — TP ELECTRD LAP L WR SPLIT33CM

## (undated) DEVICE — MONOPOLAR METZENBAUM SCISSOR, MINI BLADE TIP, DISPOSABLE: Brand: MONOPOLAR METZENBAUM SCISSOR, MINI BLADE TIP, DISPOSABLE

## (undated) DEVICE — 2, DISPOSABLE SUCTION/IRRIGATOR WITHOUT DISPOSABLE TIP: Brand: STRYKEFLOW

## (undated) DEVICE — SOL IRR NACL 0.9PCT BT 1000ML

## (undated) DEVICE — UNDYED MONOFILAMENT (POLYDIOXANONE), ABSORBABLE SYNTHETIC SURGICAL SUTURE: Brand: PDS

## (undated) DEVICE — DISPOSABLE MONOPOLAR ENDOSCOPIC CORD 10 FT. (3M): Brand: KIRWAN

## (undated) DEVICE — KT CATH CHOLANGIOGRA PERC W/BALLO

## (undated) DEVICE — SOL NACL 0.9PCT 1000ML

## (undated) DEVICE — CLAVICLE STRAP: Brand: DEROYAL

## (undated) DEVICE — RICH GENERAL LAPAROSCOPY: Brand: MEDLINE INDUSTRIES, INC.